# Patient Record
Sex: MALE | Race: WHITE | Employment: FULL TIME | URBAN - METROPOLITAN AREA
[De-identification: names, ages, dates, MRNs, and addresses within clinical notes are randomized per-mention and may not be internally consistent; named-entity substitution may affect disease eponyms.]

---

## 2017-01-09 ENCOUNTER — OFFICE VISIT (OUTPATIENT)
Dept: INTERNAL MEDICINE CLINIC | Facility: CLINIC | Age: 62
End: 2017-01-09

## 2017-01-09 VITALS
WEIGHT: 205 LBS | DIASTOLIC BLOOD PRESSURE: 94 MMHG | SYSTOLIC BLOOD PRESSURE: 196 MMHG | HEART RATE: 87 BPM | BODY MASS INDEX: 29.35 KG/M2 | HEIGHT: 70 IN

## 2017-01-09 DIAGNOSIS — I10 ESSENTIAL HYPERTENSION: Primary | ICD-10-CM

## 2017-01-09 DIAGNOSIS — B18.2 CHRONIC HEPATITIS C WITHOUT HEPATIC COMA (HCC): ICD-10-CM

## 2017-01-09 PROCEDURE — 99212 OFFICE O/P EST SF 10 MIN: CPT | Performed by: INTERNAL MEDICINE

## 2017-01-09 PROCEDURE — 99213 OFFICE O/P EST LOW 20 MIN: CPT | Performed by: INTERNAL MEDICINE

## 2017-01-09 RX ORDER — LISINOPRIL AND HYDROCHLOROTHIAZIDE 20; 12.5 MG/1; MG/1
1 TABLET ORAL
Qty: 30 TABLET | Refills: 5 | Status: SHIPPED | OUTPATIENT
Start: 2017-01-09

## 2017-01-09 NOTE — PATIENT INSTRUCTIONS
Please resume taking lisinopril/HCTZ 20/12.5 mg 1 pill daily. Please obtain blood tests soon when you can. Please try to stop smoking and drinking. Please schedule an appointment with GI.   Return visit in one month for a blood pressure check while juan

## 2017-01-09 NOTE — PROGRESS NOTES
Maida Libman is a 64year old male. Patient presents with:  Hypertension    HPI:   Mr. Laurence Lopez presents this evening for follow-up of hypertension. He is currently not taking lisinopril/HCTZ, having run out sometime ago. No out of office BP monitoring. normal soft nontender without mass or hepatosplenomegaly  EXTREMITIES: Normal without cyanosis or clubbing  PULSES: 2+ bilateral dorsalis pedis and posterior tibial      ASSESSMENT AND PLAN:   1.  Essential hypertension  Poorly controlled, likely secondary

## 2017-01-13 ENCOUNTER — APPOINTMENT (OUTPATIENT)
Dept: LAB | Facility: HOSPITAL | Age: 62
End: 2017-01-13
Attending: INTERNAL MEDICINE
Payer: COMMERCIAL

## 2017-01-13 DIAGNOSIS — B18.2 CHRONIC HEPATITIS C WITHOUT HEPATIC COMA (HCC): ICD-10-CM

## 2017-01-13 LAB
ALBUMIN SERPL BCP-MCNC: 3.9 G/DL (ref 3.5–4.8)
ALBUMIN/GLOB SERPL: 1.2 {RATIO} (ref 1–2)
ALP SERPL-CCNC: 58 U/L (ref 32–100)
ALT SERPL-CCNC: 32 U/L (ref 17–63)
ANION GAP SERPL CALC-SCNC: 8 MMOL/L (ref 0–18)
AST SERPL-CCNC: 35 U/L (ref 15–41)
BILIRUB SERPL-MCNC: 1.5 MG/DL (ref 0.3–1.2)
BUN SERPL-MCNC: 10 MG/DL (ref 8–20)
BUN/CREAT SERPL: 10.2 (ref 10–20)
CALCIUM SERPL-MCNC: 9.3 MG/DL (ref 8.5–10.5)
CHLORIDE SERPL-SCNC: 97 MMOL/L (ref 95–110)
CO2 SERPL-SCNC: 30 MMOL/L (ref 22–32)
CREAT SERPL-MCNC: 0.98 MG/DL (ref 0.5–1.5)
ERYTHROCYTE [DISTWIDTH] IN BLOOD BY AUTOMATED COUNT: 13.1 % (ref 11–15)
GLOBULIN PLAS-MCNC: 3.3 G/DL (ref 2.5–3.7)
GLUCOSE SERPL-MCNC: 111 MG/DL (ref 70–99)
HCT VFR BLD AUTO: 44.1 % (ref 41–52)
HGB BLD-MCNC: 14.9 G/DL (ref 13.5–17.5)
MCH RBC QN AUTO: 31.4 PG (ref 27–32)
MCHC RBC AUTO-ENTMCNC: 33.8 G/DL (ref 32–37)
MCV RBC AUTO: 92.9 FL (ref 80–100)
OSMOLALITY UR CALC.SUM OF ELEC: 280 MOSM/KG (ref 275–295)
PLATELET # BLD AUTO: 235 K/UL (ref 140–400)
PMV BLD AUTO: 9.6 FL (ref 7.4–10.3)
POTASSIUM SERPL-SCNC: 3.7 MMOL/L (ref 3.3–5.1)
PROT SERPL-MCNC: 7.2 G/DL (ref 5.9–8.4)
RBC # BLD AUTO: 4.74 M/UL (ref 4.5–5.9)
SODIUM SERPL-SCNC: 135 MMOL/L (ref 136–144)
WBC # BLD AUTO: 12.2 K/UL (ref 4–11)

## 2017-01-13 PROCEDURE — 80053 COMPREHEN METABOLIC PANEL: CPT

## 2017-01-13 PROCEDURE — 85027 COMPLETE CBC AUTOMATED: CPT

## 2017-01-13 PROCEDURE — 36415 COLL VENOUS BLD VENIPUNCTURE: CPT

## 2017-02-06 ENCOUNTER — OFFICE VISIT (OUTPATIENT)
Dept: INTERNAL MEDICINE CLINIC | Facility: CLINIC | Age: 62
End: 2017-02-06

## 2017-02-06 VITALS
SYSTOLIC BLOOD PRESSURE: 146 MMHG | DIASTOLIC BLOOD PRESSURE: 68 MMHG | HEART RATE: 101 BPM | BODY MASS INDEX: 28.92 KG/M2 | HEIGHT: 70 IN | WEIGHT: 202 LBS

## 2017-02-06 DIAGNOSIS — I10 ESSENTIAL HYPERTENSION: Primary | ICD-10-CM

## 2017-02-06 PROCEDURE — 99213 OFFICE O/P EST LOW 20 MIN: CPT | Performed by: INTERNAL MEDICINE

## 2017-02-06 PROCEDURE — 99212 OFFICE O/P EST SF 10 MIN: CPT | Performed by: INTERNAL MEDICINE

## 2017-02-06 NOTE — PROGRESS NOTES
Walt Castelan is a 64year old male. Patient presents with:  Hypertension    HPI:   Mr. Ange Stanford presents today for follow-up of hypertension. Since his last visit, he has resumed lisinopril/HCTZ. No out of office BP monitoring.   He expresses concern abo hypertension  Improved control though BP not yet at goal.  Discussed adjusting current medications but he declines. Therefore continue lisinopril/HCTZ at current dosage. Return visit in 3 months for blood pressure check.       The patient indicates unders

## 2017-02-15 ENCOUNTER — OFFICE VISIT (OUTPATIENT)
Dept: GASTROENTEROLOGY | Facility: CLINIC | Age: 62
End: 2017-02-15

## 2017-02-15 ENCOUNTER — LAB ENCOUNTER (OUTPATIENT)
Dept: LAB | Facility: HOSPITAL | Age: 62
End: 2017-02-15
Attending: INTERNAL MEDICINE
Payer: COMMERCIAL

## 2017-02-15 VITALS
WEIGHT: 224.63 LBS | HEIGHT: 70 IN | HEART RATE: 83 BPM | DIASTOLIC BLOOD PRESSURE: 88 MMHG | SYSTOLIC BLOOD PRESSURE: 155 MMHG | BODY MASS INDEX: 32.16 KG/M2

## 2017-02-15 DIAGNOSIS — B18.2 CHRONIC HEPATITIS C WITHOUT HEPATIC COMA (HCC): Primary | ICD-10-CM

## 2017-02-15 DIAGNOSIS — B18.2 CHRONIC HEPATITIS C WITH HEPATIC COMA (HCC): Primary | ICD-10-CM

## 2017-02-15 DIAGNOSIS — B18.2 CHRONIC HEPATITIS C WITHOUT HEPATIC COMA (HCC): ICD-10-CM

## 2017-02-15 LAB
ALBUMIN SERPL BCP-MCNC: 4 G/DL (ref 3.5–4.8)
ALBUMIN/GLOB SERPL: 1.3 {RATIO} (ref 1–2)
ALP SERPL-CCNC: 64 U/L (ref 32–100)
ALT SERPL-CCNC: 49 U/L (ref 17–63)
ANION GAP SERPL CALC-SCNC: 8 MMOL/L (ref 0–18)
AST SERPL-CCNC: 39 U/L (ref 15–41)
BASOPHILS # BLD: 0.1 K/UL (ref 0–0.2)
BASOPHILS NFR BLD: 1 %
BILIRUB SERPL-MCNC: 0.4 MG/DL (ref 0.3–1.2)
BUN SERPL-MCNC: 13 MG/DL (ref 8–20)
BUN/CREAT SERPL: 14.4 (ref 10–20)
CALCIUM SERPL-MCNC: 9.4 MG/DL (ref 8.5–10.5)
CHLORIDE SERPL-SCNC: 101 MMOL/L (ref 95–110)
CO2 SERPL-SCNC: 27 MMOL/L (ref 22–32)
CREAT SERPL-MCNC: 0.9 MG/DL (ref 0.5–1.5)
EOSINOPHIL # BLD: 0.4 K/UL (ref 0–0.7)
EOSINOPHIL NFR BLD: 4 %
ERYTHROCYTE [DISTWIDTH] IN BLOOD BY AUTOMATED COUNT: 13.3 % (ref 11–15)
GLOBULIN PLAS-MCNC: 3 G/DL (ref 2.5–3.7)
GLUCOSE SERPL-MCNC: 101 MG/DL (ref 70–99)
HCT VFR BLD AUTO: 42.4 % (ref 41–52)
HGB BLD-MCNC: 14.4 G/DL (ref 13.5–17.5)
LYMPHOCYTES # BLD: 2.5 K/UL (ref 1–4)
LYMPHOCYTES NFR BLD: 23 %
MCH RBC QN AUTO: 30.8 PG (ref 27–32)
MCHC RBC AUTO-ENTMCNC: 34.1 G/DL (ref 32–37)
MCV RBC AUTO: 90.4 FL (ref 80–100)
MONOCYTES # BLD: 1 K/UL (ref 0–1)
MONOCYTES NFR BLD: 9 %
NEUTROPHILS # BLD AUTO: 7.2 K/UL (ref 1.8–7.7)
NEUTROPHILS NFR BLD: 64 %
OSMOLALITY UR CALC.SUM OF ELEC: 282 MOSM/KG (ref 275–295)
PLATELET # BLD AUTO: 196 K/UL (ref 140–400)
PMV BLD AUTO: 10 FL (ref 7.4–10.3)
POTASSIUM SERPL-SCNC: 3.7 MMOL/L (ref 3.3–5.1)
PROT SERPL-MCNC: 7 G/DL (ref 5.9–8.4)
RBC # BLD AUTO: 4.69 M/UL (ref 4.5–5.9)
SODIUM SERPL-SCNC: 136 MMOL/L (ref 136–144)
WBC # BLD AUTO: 11.3 K/UL (ref 4–11)

## 2017-02-15 PROCEDURE — 85025 COMPLETE CBC W/AUTO DIFF WBC: CPT

## 2017-02-15 PROCEDURE — 83883 ASSAY NEPHELOMETRY NOT SPEC: CPT

## 2017-02-15 PROCEDURE — 87902 NFCT AGT GNTYP ALYS HEP C: CPT

## 2017-02-15 PROCEDURE — 36415 COLL VENOUS BLD VENIPUNCTURE: CPT

## 2017-02-15 PROCEDURE — 83520 IMMUNOASSAY QUANT NOS NONAB: CPT

## 2017-02-15 PROCEDURE — 80053 COMPREHEN METABOLIC PANEL: CPT

## 2017-02-15 PROCEDURE — 99213 OFFICE O/P EST LOW 20 MIN: CPT | Performed by: INTERNAL MEDICINE

## 2017-02-15 PROCEDURE — 99212 OFFICE O/P EST SF 10 MIN: CPT | Performed by: INTERNAL MEDICINE

## 2017-02-15 PROCEDURE — 87522 HEPATITIS C REVRS TRNSCRPJ: CPT

## 2017-02-15 RX ORDER — TAMSULOSIN HYDROCHLORIDE 0.4 MG/1
0.4 CAPSULE ORAL DAILY
Refills: 6 | COMMUNITY
Start: 2017-02-12

## 2017-02-17 NOTE — PROGRESS NOTES
HPI:    Patient ID: Juilo C Laureano is a 64year old male. HPI  Linda Spencer returns in follow-up.   He was last seen in May 2016 when he underwent upper and lower endoscopy for an alteration in bowel habits, family history of colon cancer and gastroesophageal ref (40 mg total) by mouth every morning before breakfast. Disp: 30 tablet Rfl: 11   finasteride 5 MG Oral Tab Take 1 tablet (5 mg total) by mouth daily. Disp: 60 tablet Rfl: 6   tamsulosin HCl 0.4 MG Oral Cap Take 0.4 mg by mouth daily.  Disp:  Rfl: 6     Aly RATIO      1.0-2.0 1.2     ANION GAP      0-18 8     BUN/CREA RATIO      10.0-20.0 10.2     CALCULATED OSMOLALITY      275-295 mOsm/kg 280     GFR, Non-      >=60 >60     GFR, -American      >=60 >60     WBC      4.0-11.0 K/UL 12.2 ( optimal.    Gastroesophageal reflux  Controlled on pantoprazole. This should continue.   The patient has endoscopically documented erosive esophagitis    Personal history of adenomatous colon polyps and family history of colon cancer  The patient had multi

## 2017-02-19 LAB
HCV RNA QNT PCR INTERPRETATION: DETECTED
HCV RNA QNT REAL-TIME PCR LOG IU/ML: 6.7 LOG IU

## 2017-02-22 ENCOUNTER — TELEPHONE (OUTPATIENT)
Dept: GASTROENTEROLOGY | Facility: CLINIC | Age: 62
End: 2017-02-22

## 2017-02-22 DIAGNOSIS — B18.2 CHRONIC HEPATITIS C WITHOUT HEPATIC COMA (HCC): Primary | ICD-10-CM

## 2017-02-22 RX ORDER — LEDIPASVIR AND SOFOSBUVIR 90; 400 MG/1; MG/1
TABLET, FILM COATED ORAL DAILY
Qty: 30 TABLET | Refills: 1 | Status: SHIPPED | OUTPATIENT
Start: 2017-02-22 | End: 2017-02-27

## 2017-02-22 NOTE — TELEPHONE ENCOUNTER
----- Message from Lina Carrillo MD sent at 2/21/2017  5:25 PM CST -----  GI RN staff: Please contact the patient tomorrow. Please inform him that he remains hepatitis C positive (he knows that he has hepatitis C). He is genotype 1.   We are waitin

## 2017-02-22 NOTE — TELEPHONE ENCOUNTER
Spoke with Yue Hays @ [employer] and she will be faxing us the form now    [facility] # [phone]    Pt does not have US scheduled until 3/4

## 2017-02-22 NOTE — TELEPHONE ENCOUNTER
Received Fibrospect II results. Placed on MD desk for review    15 - F0-F2 (0-41)    Will investigate approval for Harvoni 90-40mg once daily x 8 weeks, correct?

## 2017-02-22 NOTE — TELEPHONE ENCOUNTER
Will start PA. Med sent to local pharmacy to obtain PA Information    Attempted to call pt today but no answer and no VM set up currently.  Await call back from pt

## 2017-02-27 RX ORDER — LEDIPASVIR AND SOFOSBUVIR 90; 400 MG/1; MG/1
TABLET, FILM COATED ORAL DAILY
Qty: 30 TABLET | Refills: 1 | Status: SHIPPED | OUTPATIENT
Start: 2017-02-27 | End: 2017-02-27

## 2017-02-27 RX ORDER — LEDIPASVIR AND SOFOSBUVIR 90; 400 MG/1; MG/1
TABLET, FILM COATED ORAL DAILY
Qty: 30 TABLET | Refills: 1 | Status: SHIPPED | OUTPATIENT
Start: 2017-02-27 | End: 2017-03-29

## 2017-02-27 NOTE — TELEPHONE ENCOUNTER
Spoke with pt and reviewed results and Naomy Jayden approval    Was able to obtain [facility] assistance card which I am hoping he can use with [facility] pharmacy    [address] [facility] [id number]  [address] [facility] [phone]

## 2017-02-27 NOTE — TELEPHONE ENCOUNTER
Received approval fax back from [address] [place] [address],[address] for [place] 90-400mg once daily.  Valid through 2/22/17-5/17/2017 with 2 valid prescription refills only for this duration for this approval.  Case # [id number]  Spoke with Karen Casillas from [employer] - 2/27 to confirm b

## 2017-02-27 NOTE — TELEPHONE ENCOUNTER
Attempted to reach pt again (see previous attempt) regarding approval and to discuss mailorder and copay.     FYI sent to [facility] that approval for 8 weeks was obtained

## 2017-03-02 NOTE — TELEPHONE ENCOUNTER
Contacted [facility] today to confirm copay. Still $150 but they have not processed with copay assistance. I gave all copay card information. Processed with $5 copay now.     Pt needs to call [facility] to confirm shipment of medication - [phone]    No answer

## 2017-03-02 NOTE — TELEPHONE ENCOUNTER
Spoke with pt and reviewed okay to hold on US liver for now. Not to say he may want to order in future to check on liver    He will call [facility] today to schedule shipment. He will call us before starting on Harvoni so we can document.   I have already remin

## 2017-03-02 NOTE — TELEPHONE ENCOUNTER
Pt called to find out when he is supposed to expect to receive medication and also needs to know if he should still have U/S because he already has Hep C diagnosis. Please call.

## 2017-03-06 ENCOUNTER — TELEPHONE (OUTPATIENT)
Dept: GASTROENTEROLOGY | Facility: CLINIC | Age: 62
End: 2017-03-06

## 2017-03-06 NOTE — TELEPHONE ENCOUNTER
Received medicine - [facility] but now states he is holding off until after Eye surgery. Reviewed there is no guarantee now that we can extended the approval dates OR no guarantee if his insurance changes that we can get approval he needs.   Reviewed best not

## 2017-03-10 ENCOUNTER — TELEPHONE (OUTPATIENT)
Dept: GASTROENTEROLOGY | Facility: CLINIC | Age: 62
End: 2017-03-10

## 2017-03-10 NOTE — TELEPHONE ENCOUNTER
Kaycee, Rn - I phoned pt re: below (also see your previous note). He states the eye doc gave approval to start med. He started 2 days ago. He would like you to order the next round b/c his insurance is changing. I was not sure if this was too soon.    Se

## 2017-03-10 NOTE — TELEPHONE ENCOUNTER
Pt called and stated that he started taking the med on 03/08/2017. Please call thank you        Current outpatient prescriptions:   •  Ledipasvir-Sofosbuvir (HARVONI)  MG Oral Tab, Take  mg by mouth daily. , Disp: 30 tablet, Rfl: 1

## 2017-03-21 ENCOUNTER — TELEPHONE (OUTPATIENT)
Dept: GASTROENTEROLOGY | Facility: CLINIC | Age: 62
End: 2017-03-21

## 2017-03-21 NOTE — TELEPHONE ENCOUNTER
Tried to reach pt. Voicemail not set up yet. Unable to leave message. Only one phone # listed. Phoned Coral at [facility] and received VM.  Left detailed message that 1) He needs to call PCP for clearance (qasim Nix name and #) and 2) I

## 2017-03-21 NOTE — TELEPHONE ENCOUNTER
Pt is having cataract surgery on 3/23/17 and states that pt is being treated for Hep C. Please fax latest lab results and also note that states OK to have cataract surgery under MAC sedation. Please fax to [phone].

## 2017-03-21 NOTE — TELEPHONE ENCOUNTER
Dr. Trish Dillon - Please see below request from [facility]. 1) Pls write letter re: surgical clearance and using MAC if agreed  2) I have labs with cover sheet at my desk if ok to send as well.

## 2017-03-22 ENCOUNTER — TELEPHONE (OUTPATIENT)
Dept: INTERNAL MEDICINE CLINIC | Facility: CLINIC | Age: 62
End: 2017-03-22

## 2017-03-22 NOTE — TELEPHONE ENCOUNTER
Pt states he left message for Dr Luz Rueda office a week ago and they called him yesterday to inform of below. Now they are cancelling eye surgery due to no clearance.     I advised pt that I had previously discussed no surgery while on Hep C treatment for t

## 2017-03-22 NOTE — TELEPHONE ENCOUNTER
Dolores Starks from Dr. Hanson Patricia office calling regarding needing pt recent lab results for his liver enzymes. .. please advise and fax to [phone] pt is coming in tomorrow

## 2017-03-27 ENCOUNTER — LAB ENCOUNTER (OUTPATIENT)
Dept: LAB | Facility: HOSPITAL | Age: 62
End: 2017-03-27
Attending: INTERNAL MEDICINE
Payer: COMMERCIAL

## 2017-03-27 DIAGNOSIS — B18.2 CHRONIC HEPATITIS C WITHOUT HEPATIC COMA (HCC): ICD-10-CM

## 2017-03-27 LAB
ALBUMIN SERPL BCP-MCNC: 4.1 G/DL (ref 3.5–4.8)
ALBUMIN/GLOB SERPL: 1.1 {RATIO} (ref 1–2)
ALP SERPL-CCNC: 64 U/L (ref 32–100)
ALT SERPL-CCNC: 21 U/L (ref 17–63)
ANION GAP SERPL CALC-SCNC: 9 MMOL/L (ref 0–18)
AST SERPL-CCNC: 26 U/L (ref 15–41)
BASOPHILS # BLD: 0.1 K/UL (ref 0–0.2)
BASOPHILS NFR BLD: 1 %
BILIRUB SERPL-MCNC: 0.7 MG/DL (ref 0.3–1.2)
BUN SERPL-MCNC: 21 MG/DL (ref 8–20)
BUN/CREAT SERPL: 19.3 (ref 10–20)
CALCIUM SERPL-MCNC: 10.2 MG/DL (ref 8.5–10.5)
CHLORIDE SERPL-SCNC: 101 MMOL/L (ref 95–110)
CO2 SERPL-SCNC: 29 MMOL/L (ref 22–32)
CREAT SERPL-MCNC: 1.09 MG/DL (ref 0.5–1.5)
EOSINOPHIL # BLD: 0.5 K/UL (ref 0–0.7)
EOSINOPHIL NFR BLD: 5 %
ERYTHROCYTE [DISTWIDTH] IN BLOOD BY AUTOMATED COUNT: 13.8 % (ref 11–15)
GLOBULIN PLAS-MCNC: 3.8 G/DL (ref 2.5–3.7)
GLUCOSE SERPL-MCNC: 108 MG/DL (ref 70–99)
HCT VFR BLD AUTO: 46.6 % (ref 41–52)
HGB BLD-MCNC: 15.8 G/DL (ref 13.5–17.5)
LYMPHOCYTES # BLD: 2.8 K/UL (ref 1–4)
LYMPHOCYTES NFR BLD: 29 %
MCH RBC QN AUTO: 30.9 PG (ref 27–32)
MCHC RBC AUTO-ENTMCNC: 33.9 G/DL (ref 32–37)
MCV RBC AUTO: 91.2 FL (ref 80–100)
MONOCYTES # BLD: 1 K/UL (ref 0–1)
MONOCYTES NFR BLD: 11 %
NEUTROPHILS # BLD AUTO: 5.4 K/UL (ref 1.8–7.7)
NEUTROPHILS NFR BLD: 55 %
OSMOLALITY UR CALC.SUM OF ELEC: 292 MOSM/KG (ref 275–295)
PLATELET # BLD AUTO: 222 K/UL (ref 140–400)
PMV BLD AUTO: 9.3 FL (ref 7.4–10.3)
POTASSIUM SERPL-SCNC: 4.9 MMOL/L (ref 3.3–5.1)
PROT SERPL-MCNC: 7.9 G/DL (ref 5.9–8.4)
RBC # BLD AUTO: 5.11 M/UL (ref 4.5–5.9)
SODIUM SERPL-SCNC: 139 MMOL/L (ref 136–144)
WBC # BLD AUTO: 9.8 K/UL (ref 4–11)

## 2017-03-27 PROCEDURE — 85025 COMPLETE CBC W/AUTO DIFF WBC: CPT

## 2017-03-27 PROCEDURE — 87522 HEPATITIS C REVRS TRNSCRPJ: CPT

## 2017-03-27 PROCEDURE — 36415 COLL VENOUS BLD VENIPUNCTURE: CPT

## 2017-03-27 PROCEDURE — 80053 COMPREHEN METABOLIC PANEL: CPT

## 2017-03-29 DIAGNOSIS — B18.2 CHRONIC HEPATITIS C WITHOUT HEPATIC COMA (HCC): Primary | ICD-10-CM

## 2017-03-29 LAB
HCV RNA QNT PCR INTERPRETATION: NOT DETECTED
HCV RNA QNT REAL-TIME PCR IU/ML: <15 IU/ML
HCV RNA QNT REAL-TIME PCR LOG IU/ML: <1.2 LOG IU

## 2017-03-29 NOTE — TELEPHONE ENCOUNTER
----- Message from Shilpa De Anda MD sent at 3/29/2017  9:51 AM CDT -----  Please inform patient that his blood work is normal and the hepatitis C virus is undetected. He should continue the [facility]. Repeat CBC, CMP and hepatitis C RNA in 1 month.

## 2017-03-31 NOTE — TELEPHONE ENCOUNTER
Pt contacted and reviewed labwork below. He states he has new insurance - [employer] with high deductible      He did not have insurance info now to give to me.  Will call  Him next week - he wants to know what the cost is for labs

## 2017-08-11 ENCOUNTER — TELEPHONE (OUTPATIENT)
Dept: GASTROENTEROLOGY | Facility: CLINIC | Age: 62
End: 2017-08-11

## 2017-08-11 NOTE — TELEPHONE ENCOUNTER
Mailed letter to patient notifying him of overdue labs (CBC, CMP, and HCV RT-PCR ) Ordered 3-29-17 and due 1 month after order was placed. Overdue letter mailed to patient.

## 2020-01-01 ENCOUNTER — EXTERNAL RECORD (OUTPATIENT)
Dept: OTHER | Age: 65
End: 2020-01-01

## 2020-01-29 ENCOUNTER — HOSPITAL (OUTPATIENT)
Dept: OTHER | Age: 65
End: 2020-01-29
Attending: ANESTHESIOLOGY

## 2020-01-30 ENCOUNTER — HOSPITAL (OUTPATIENT)
Dept: OTHER | Age: 65
End: 2020-01-30
Attending: ANESTHESIOLOGY

## 2020-02-19 ENCOUNTER — HOSPITAL (OUTPATIENT)
Dept: OTHER | Age: 65
End: 2020-02-19
Attending: ORTHOPAEDIC SURGERY

## 2020-02-20 ENCOUNTER — HOSPITAL (OUTPATIENT)
Dept: OTHER | Age: 65
End: 2020-02-20
Attending: ANESTHESIOLOGY

## 2020-02-21 ENCOUNTER — HOSPITAL (OUTPATIENT)
Dept: OTHER | Age: 65
End: 2020-02-21
Attending: ANESTHESIOLOGY

## 2020-02-25 ENCOUNTER — HOSPITAL (OUTPATIENT)
Dept: OTHER | Age: 65
End: 2020-02-25
Attending: FAMILY MEDICINE

## 2020-02-25 LAB
A/G RATIO_: 1.4
ABS LYMPH: 2.3 K/CUMM (ref 1–3.5)
ABS MONO: 1.1 K/CUMM (ref 0.1–0.8)
ABS NEUTRO: 7.7 K/CUMM (ref 2–8)
ALBUMIN: 4.3 G/DL (ref 3.5–5)
ALK PHOS: 83 UNIT/L (ref 50–124)
ALT/GPT: 12 UNIT/L (ref 0–55)
ANION GAP SERPL CALC-SCNC: 15 MEQ/L (ref 10–20)
AST/GOT: 15 UNIT/L (ref 5–34)
BASOPHIL: 1 % (ref 0–1)
BILI TOTAL: 0.2 MG/DL (ref 0.2–1)
BUN SERPL-MCNC: 17 MG/DL (ref 6–20)
CALCIUM: 9.6 MG/DL (ref 8.4–10.2)
CHLORIDE: 102 MEQ/L (ref 97–107)
CREATININE: 1.09 MG/DL (ref 0.6–1.3)
DIFF_TYPE?: ABNORMAL
EOSINOPHIL: 1 % (ref 0–6)
GLOBULIN_: 3.1 G/DL (ref 2–4.1)
GLUCOSE LVL: 92 MG/DL (ref 70–99)
HCT VFR BLD CALC: 46 % (ref 36–51)
HEMOLYSIS 2+: NEGATIVE
HGB A1C: 5.5 %
HGB BLD-MCNC: 15.5 G/DL (ref 12–17)
IMMATURE GRAN: 0.9 % (ref 0–0.3)
INSTR WBC: 11.5 K/CUMM (ref 4–11)
LIPEMIC 3+: NEGATIVE
LYMPHOCYTE: 20 %
MCH RBC QN AUTO: 31 PG (ref 25–35)
MCHC RBC AUTO-ENTMCNC: 33 G/DL (ref 32–37)
MCV RBC AUTO: 94 FL (ref 78–97)
MONOCYTE: 10 %
NEUTROPHIL: 67 %
NRBC BLD MANUAL-RTO: 0 % (ref 0–0.2)
PLATELET: 231 K/CUMM (ref 150–450)
POTASSIUM: 4.8 MEQ/L (ref 3.5–5.1)
RBC # BLD: 4.94 M/CUMM (ref 4.2–6)
RDW: 12.8 % (ref 11.5–14.5)
SODIUM: 142 MEQ/L (ref 136–145)
TCO2: 30 MEQ/L (ref 19–29)
TOTAL PROTEIN: 7.4 G/DL (ref 6.4–8.3)
WBC # BLD: 11.5 K/CUMM (ref 4–11)

## 2020-02-27 ENCOUNTER — HOSPITAL (OUTPATIENT)
Dept: OTHER | Age: 65
End: 2020-02-27
Attending: ANESTHESIOLOGY

## 2020-03-02 ENCOUNTER — HOSPITAL (OUTPATIENT)
Dept: OTHER | Age: 65
End: 2020-03-02
Attending: ORTHOPAEDIC SURGERY

## 2020-08-11 ENCOUNTER — HOSPITAL (OUTPATIENT)
Dept: OTHER | Age: 65
End: 2020-08-11
Attending: PHYSICIAN ASSISTANT

## 2020-09-01 LAB
A/G RATIO_: 1.4
A/G RATIO_: 1.4
ABS LYMPH: 1.9 K/CUMM (ref 1–3.5)
ABS LYMPH: 1.9 K/CUMM (ref 1–3.5)
ABS MONO: 0.8 K/CUMM (ref 0.1–0.8)
ABS MONO: 0.8 K/CUMM (ref 0.1–0.8)
ABS NEUTRO: 4.7 K/CUMM (ref 2–8)
ABS NEUTRO: 4.7 K/CUMM (ref 2–8)
ALBUMIN: 4 G/DL (ref 3.5–5)
ALBUMIN: 4 G/DL (ref 3.5–5)
ALK PHOS: 98 UNIT/L (ref 50–124)
ALK PHOS: 98 UNIT/L (ref 50–124)
ALT/GPT: 8 UNIT/L (ref 0–55)
ALT/GPT: 8 UNIT/L (ref 0–55)
ANION GAP SERPL CALC-SCNC: 11 MEQ/L (ref 10–20)
ANION GAP SERPL CALC-SCNC: 11 MEQ/L (ref 10–20)
AST/GOT: 13 UNIT/L (ref 5–34)
AST/GOT: 13 UNIT/L (ref 5–34)
BASOPHIL: 1 % (ref 0–1)
BASOPHIL: 1 % (ref 0–1)
BILI TOTAL: 0.4 MG/DL (ref 0.2–1)
BILI TOTAL: 0.4 MG/DL (ref 0.2–1)
BUN SERPL-MCNC: 8 MG/DL (ref 6–20)
BUN SERPL-MCNC: 8 MG/DL (ref 6–20)
CALCIUM: 9.3 MG/DL (ref 8.4–10.2)
CALCIUM: 9.3 MG/DL (ref 8.4–10.2)
CHLORIDE: 107 MEQ/L (ref 97–107)
CHLORIDE: 107 MEQ/L (ref 97–107)
CREATININE: 0.9 MG/DL (ref 0.6–1.3)
CREATININE: 0.9 MG/DL (ref 0.6–1.3)
DIFF_TYPE?: NORMAL
EOSINOPHIL: 5 % (ref 0–6)
EOSINOPHIL: 5 % (ref 0–6)
GLOBULIN_: 2.9 G/DL (ref 2–4.1)
GLOBULIN_: 2.9 G/DL (ref 2–4.1)
GLUCOSE LVL: 79 MG/DL (ref 70–99)
GLUCOSE LVL: 79 MG/DL (ref 70–99)
HCT VFR BLD CALC: 43 % (ref 36–51)
HCT VFR BLD CALC: 43 % (ref 36–51)
HEMOLYSIS 2+: NEGATIVE
HGB BLD-MCNC: 14.5 G/DL (ref 12–17)
HGB BLD-MCNC: 14.5 G/DL (ref 12–17)
IMMATURE GRAN: 0.3 % (ref 0–0.3)
IMMATURE GRAN: 0.3 % (ref 0–0.3)
INSTR WBC: 7.9 K/CUMM (ref 4–11)
LIPEMIC 3+: NEGATIVE
LYMPHOCYTE: 24 %
LYMPHOCYTE: 24 %
MCH RBC QN AUTO: 30 PG (ref 25–35)
MCH RBC QN AUTO: 30 PG (ref 25–35)
MCHC RBC AUTO-ENTMCNC: 33 G/DL (ref 32–37)
MCHC RBC AUTO-ENTMCNC: 33 G/DL (ref 32–37)
MCV RBC AUTO: 91 FL (ref 78–97)
MCV RBC AUTO: 91 FL (ref 78–97)
MONOCYTE: 10 %
MONOCYTE: 10 %
NEUTROPHIL: 60 %
NEUTROPHIL: 60 %
NRBC BLD MANUAL-RTO: 0 % (ref 0–0.2)
NRBC BLD MANUAL-RTO: 0 % (ref 0–0.2)
PLATELET: 213 K/CUMM (ref 150–450)
PLATELET: 213 K/CUMM (ref 150–450)
POTASSIUM: 4.2 MEQ/L (ref 3.5–5.1)
POTASSIUM: 4.2 MEQ/L (ref 3.5–5.1)
RBC # BLD: 4.76 M/CUMM (ref 4.2–6)
RBC # BLD: 4.76 M/CUMM (ref 4.2–6)
RDW: 12.9 % (ref 11.5–14.5)
RDW: 12.9 % (ref 11.5–14.5)
SODIUM: 142 MEQ/L (ref 136–145)
SODIUM: 142 MEQ/L (ref 136–145)
TCO2: 28 MEQ/L (ref 19–29)
TCO2: 28 MEQ/L (ref 19–29)
TOTAL PROTEIN: 6.9 G/DL (ref 6.4–8.3)
TOTAL PROTEIN: 6.9 G/DL (ref 6.4–8.3)
WBC # BLD: 7.9 K/CUMM (ref 4–11)
WBC # BLD: 7.9 K/CUMM (ref 4–11)

## 2020-09-11 LAB
A/G RATIO_: 1.2
A/G RATIO_: 1.2
ABS LYMPH: 1.8 K/CUMM (ref 1–3.5)
ABS LYMPH: 1.8 K/CUMM (ref 1–3.5)
ABS MONO: 0.8 K/CUMM (ref 0.1–0.8)
ABS MONO: 0.8 K/CUMM (ref 0.1–0.8)
ABS NEUTRO: 5.8 K/CUMM (ref 2–8)
ABS NEUTRO: 5.8 K/CUMM (ref 2–8)
ALBUMIN: 4.1 G/DL (ref 3.5–5)
ALBUMIN: 4.1 G/DL (ref 3.5–5)
ALK PHOS: 115 UNIT/L (ref 50–124)
ALK PHOS: 115 UNIT/L (ref 50–124)
ALT/GPT: 9 UNIT/L (ref 0–55)
ALT/GPT: 9 UNIT/L (ref 0–55)
ANION GAP SERPL CALC-SCNC: 13 MEQ/L (ref 10–20)
ANION GAP SERPL CALC-SCNC: 13 MEQ/L (ref 10–20)
AST/GOT: 13 UNIT/L (ref 5–34)
AST/GOT: 13 UNIT/L (ref 5–34)
BASOPHIL: 1 % (ref 0–1)
BASOPHIL: 1 % (ref 0–1)
BILI TOTAL: 0.2 MG/DL (ref 0.2–1)
BILI TOTAL: 0.2 MG/DL (ref 0.2–1)
BUN SERPL-MCNC: 17 MG/DL (ref 6–20)
BUN SERPL-MCNC: 17 MG/DL (ref 6–20)
CALCIUM: 9.4 MG/DL (ref 8.4–10.2)
CALCIUM: 9.4 MG/DL (ref 8.4–10.2)
CHLORIDE: 106 MEQ/L (ref 97–107)
CHLORIDE: 106 MEQ/L (ref 97–107)
CREATININE: 0.98 MG/DL (ref 0.6–1.3)
CREATININE: 0.98 MG/DL (ref 0.6–1.3)
DIFF_TYPE?: NORMAL
EOSINOPHIL: 5 % (ref 0–6)
EOSINOPHIL: 5 % (ref 0–6)
GLOBULIN_: 3.3 G/DL (ref 2–4.1)
GLOBULIN_: 3.3 G/DL (ref 2–4.1)
GLUCOSE LVL: 98 MG/DL (ref 70–99)
GLUCOSE LVL: 98 MG/DL (ref 70–99)
HCT VFR BLD CALC: 45 % (ref 36–51)
HCT VFR BLD CALC: 45 % (ref 36–51)
HEMOLYSIS 2+: NEGATIVE
HGB BLD-MCNC: 15.2 G/DL (ref 12–17)
HGB BLD-MCNC: 15.2 G/DL (ref 12–17)
IMMATURE GRAN: 0.2 % (ref 0–0.3)
IMMATURE GRAN: 0.2 % (ref 0–0.3)
INSTR WBC: 8.9 K/CUMM (ref 4–11)
LIPEMIC 3+: NEGATIVE
LYMPHOCYTE: 20 %
LYMPHOCYTE: 20 %
MCH RBC QN AUTO: 31 PG (ref 25–35)
MCH RBC QN AUTO: 31 PG (ref 25–35)
MCHC RBC AUTO-ENTMCNC: 34 G/DL (ref 32–37)
MCHC RBC AUTO-ENTMCNC: 34 G/DL (ref 32–37)
MCV RBC AUTO: 92 FL (ref 78–97)
MCV RBC AUTO: 92 FL (ref 78–97)
MONOCYTE: 9 %
MONOCYTE: 9 %
NEUTROPHIL: 65 %
NEUTROPHIL: 65 %
NRBC BLD MANUAL-RTO: 0 % (ref 0–0.2)
NRBC BLD MANUAL-RTO: 0 % (ref 0–0.2)
PLATELET: 246 K/CUMM (ref 150–450)
PLATELET: 246 K/CUMM (ref 150–450)
POTASSIUM: 5 MEQ/L (ref 3.5–5.1)
POTASSIUM: 5 MEQ/L (ref 3.5–5.1)
RBC # BLD: 4.91 M/CUMM (ref 4.2–6)
RBC # BLD: 4.91 M/CUMM (ref 4.2–6)
RDW: 12.9 % (ref 11.5–14.5)
RDW: 12.9 % (ref 11.5–14.5)
SODIUM: 141 MEQ/L (ref 136–145)
SODIUM: 141 MEQ/L (ref 136–145)
TCO2: 27 MEQ/L (ref 19–29)
TCO2: 27 MEQ/L (ref 19–29)
TOTAL PROTEIN: 7.4 G/DL (ref 6.4–8.3)
TOTAL PROTEIN: 7.4 G/DL (ref 6.4–8.3)
WBC # BLD: 8.9 K/CUMM (ref 4–11)
WBC # BLD: 8.9 K/CUMM (ref 4–11)

## 2020-09-12 LAB
ISOLATION GUIDELINES: NORMAL
SARS-COV-2 RNA RESP QL NAA+PROBE: NOT DETECTED
SOURCE, 2019 NOVEL CORONAVIRUS (SARS-COV-2): NORMAL

## 2020-09-13 LAB
SARS-COV-2 RNA SPEC QL NAA+PROBE: NOT DETECTED
SPECIMEN SOURCE: NORMAL

## 2020-09-14 ENCOUNTER — HOSPITAL (OUTPATIENT)
Dept: OTHER | Age: 65
End: 2020-09-14
Attending: ORTHOPAEDIC SURGERY

## 2020-09-14 ENCOUNTER — HOSPITAL (OUTPATIENT)
Dept: OTHER | Age: 65
End: 2020-09-14

## 2020-09-14 LAB
ANION GAP SERPL CALC-SCNC: 13 MEQ/L (ref 10–20)
ANION GAP SERPL CALC-SCNC: 13 MEQ/L (ref 10–20)
BUN SERPL-MCNC: 14 MG/DL (ref 6–20)
BUN SERPL-MCNC: 14 MG/DL (ref 6–20)
CALCIUM: 9.1 MG/DL (ref 8.4–10.2)
CALCIUM: 9.1 MG/DL (ref 8.4–10.2)
CHLORIDE: 105 MEQ/L (ref 97–107)
CHLORIDE: 105 MEQ/L (ref 97–107)
CREATININE: 0.88 MG/DL (ref 0.6–1.3)
CREATININE: 0.88 MG/DL (ref 0.6–1.3)
GLUCOSE LVL: 78 MG/DL (ref 70–99)
GLUCOSE LVL: 78 MG/DL (ref 70–99)
HEMOLYSIS 2+: NEGATIVE
POTASSIUM: 3.8 MEQ/L (ref 3.5–5.1)
POTASSIUM: 3.8 MEQ/L (ref 3.5–5.1)
REPORT TEXT: NORMAL
SODIUM: 139 MEQ/L (ref 136–145)
SODIUM: 139 MEQ/L (ref 136–145)
TCO2: 25 MEQ/L (ref 19–29)
TCO2: 25 MEQ/L (ref 19–29)

## 2020-10-23 ENCOUNTER — HOSPITAL (OUTPATIENT)
Dept: OTHER | Age: 65
End: 2020-10-23
Attending: STUDENT IN AN ORGANIZED HEALTH CARE EDUCATION/TRAINING PROGRAM

## 2020-11-18 ENCOUNTER — IMAGING SERVICES (OUTPATIENT)
Dept: OTHER | Age: 65
End: 2020-11-18

## 2020-12-14 ENCOUNTER — HOSPITAL ENCOUNTER (OUTPATIENT)
Dept: GENERAL RADIOLOGY | Age: 65
Discharge: HOME | End: 2020-12-14
Attending: FAMILY MEDICINE

## 2020-12-14 ENCOUNTER — HOSPITAL ENCOUNTER (OUTPATIENT)
Dept: LAB | Age: 65
Discharge: HOME | End: 2020-12-14
Attending: FAMILY MEDICINE

## 2020-12-14 DIAGNOSIS — M25.50 JOINT PAIN: Primary | ICD-10-CM

## 2020-12-14 DIAGNOSIS — F17.200 NICOTINE DEPENDENCE: ICD-10-CM

## 2020-12-14 DIAGNOSIS — Z72.0 TOBACCO USE: ICD-10-CM

## 2020-12-14 DIAGNOSIS — Z01.818 PREOP EXAMINATION: ICD-10-CM

## 2020-12-14 DIAGNOSIS — M25.50 JOINT PAIN: ICD-10-CM

## 2020-12-14 LAB
ALBUMIN SERPL-MCNC: 3.8 G/DL (ref 3.6–5.1)
ALBUMIN/GLOB SERPL: 1.1 {RATIO} (ref 1–2.4)
ALP SERPL-CCNC: 108 UNITS/L (ref 45–117)
ALT SERPL-CCNC: 17 UNITS/L
ANION GAP SERPL CALC-SCNC: 13 MMOL/L (ref 10–20)
AST SERPL-CCNC: 17 UNITS/L
BASOPHILS # BLD: 0.1 K/MCL (ref 0–0.3)
BASOPHILS NFR BLD: 1 %
BILIRUB SERPL-MCNC: 0.2 MG/DL (ref 0.2–1)
BUN SERPL-MCNC: 17 MG/DL (ref 6–20)
BUN/CREAT SERPL: 18 (ref 7–25)
CALCIUM SERPL-MCNC: 9.2 MG/DL (ref 8.4–10.2)
CHLORIDE SERPL-SCNC: 102 MMOL/L (ref 98–107)
CO2 SERPL-SCNC: 28 MMOL/L (ref 21–32)
CREAT SERPL-MCNC: 0.97 MG/DL (ref 0.67–1.17)
DEPRECATED RDW RBC: 43.2 FL (ref 39–50)
EOSINOPHIL # BLD: 0.4 K/MCL (ref 0–0.5)
EOSINOPHIL NFR BLD: 4 %
ERYTHROCYTE [DISTWIDTH] IN BLOOD: 12.7 % (ref 11–15)
FASTING DURATION TIME PATIENT: ABNORMAL H
GFR SERPLBLD BASED ON 1.73 SQ M-ARVRAT: 82 ML/MIN/1.73M2
GLOBULIN SER-MCNC: 3.6 G/DL (ref 2–4)
GLUCOSE SERPL-MCNC: 85 MG/DL (ref 65–99)
HCT VFR BLD CALC: 46.8 % (ref 39–51)
HGB BLD-MCNC: 15.5 G/DL (ref 13–17)
IMM GRANULOCYTES # BLD AUTO: 0 K/MCL (ref 0–0.2)
IMM GRANULOCYTES # BLD: 0 %
LYMPHOCYTES # BLD: 2.1 K/MCL (ref 1–4)
LYMPHOCYTES NFR BLD: 27 %
MCH RBC QN AUTO: 30.5 PG (ref 26–34)
MCHC RBC AUTO-ENTMCNC: 33.1 G/DL (ref 32–36.5)
MCV RBC AUTO: 91.9 FL (ref 78–100)
MONOCYTES # BLD: 0.8 K/MCL (ref 0.3–0.9)
MONOCYTES NFR BLD: 10 %
NEUTROPHILS # BLD: 4.6 K/MCL (ref 1.8–7.7)
NEUTROPHILS NFR BLD: 58 %
NRBC BLD MANUAL-RTO: 0 /100 WBC
PLATELET # BLD AUTO: 221 K/MCL (ref 140–450)
POTASSIUM SERPL-SCNC: 4.2 MMOL/L (ref 3.4–5.1)
PROT SERPL-MCNC: 7.4 G/DL (ref 6.4–8.2)
RBC # BLD: 5.09 MIL/MCL (ref 4.5–5.9)
SODIUM SERPL-SCNC: 139 MMOL/L (ref 135–145)
WBC # BLD: 8 K/MCL (ref 4.2–11)

## 2020-12-14 PROCEDURE — 80053 COMPREHEN METABOLIC PANEL: CPT | Performed by: FAMILY MEDICINE

## 2020-12-14 PROCEDURE — 71046 X-RAY EXAM CHEST 2 VIEWS: CPT

## 2020-12-14 PROCEDURE — 85025 COMPLETE CBC W/AUTO DIFF WBC: CPT | Performed by: FAMILY MEDICINE

## 2020-12-14 PROCEDURE — 36415 COLL VENOUS BLD VENIPUNCTURE: CPT | Performed by: FAMILY MEDICINE

## 2020-12-28 RX ORDER — DULOXETIN HYDROCHLORIDE 30 MG/1
30 CAPSULE, DELAYED RELEASE ORAL EVERY MORNING
COMMUNITY
End: 2022-01-19 | Stop reason: ALTCHOICE

## 2020-12-28 RX ORDER — CARBAMAZEPINE 100 MG/1
100 TABLET, EXTENDED RELEASE ORAL EVERY MORNING
COMMUNITY
End: 2022-01-19 | Stop reason: ALTCHOICE

## 2020-12-28 RX ORDER — GABAPENTIN 300 MG/1
300 CAPSULE ORAL 2 TIMES DAILY
COMMUNITY

## 2020-12-28 SDOH — HEALTH STABILITY: MENTAL HEALTH: HOW OFTEN DO YOU HAVE A DRINK CONTAINING ALCOHOL?: NEVER

## 2020-12-28 ASSESSMENT — ACTIVITIES OF DAILY LIVING (ADL)
NEEDS_ASSIST: NO
ADL_BEFORE_ADMISSION: INDEPENDENT
ADL_SHORT_OF_BREATH: YES
HISTORY OF FALLING IN THE LAST YEAR (PRIOR TO ADMISSION): YES
SENSORY_SUPPORT_DEVICES: EYEGLASSES
ADL_SCORE: 12

## 2020-12-28 ASSESSMENT — COGNITIVE AND FUNCTIONAL STATUS - GENERAL
ARE YOU DEAF OR DO YOU HAVE SERIOUS DIFFICULTY  HEARING: NO
ARE YOU BLIND OR DO YOU HAVE SERIOUS DIFFICULTY SEEING, EVEN WHEN WEARING GLASSES: NO

## 2021-01-02 ENCOUNTER — LAB SERVICES (OUTPATIENT)
Dept: LAB | Age: 66
DRG: 494 | End: 2021-01-02
Attending: INTERNAL MEDICINE

## 2021-01-02 DIAGNOSIS — Z01.812 PRE-PROCEDURAL LABORATORY EXAMINATION: ICD-10-CM

## 2021-01-02 PROCEDURE — U0005 INFEC AGEN DETEC AMPLI PROBE: HCPCS

## 2021-01-03 LAB
SARS-COV-2 RNA RESP QL NAA+PROBE: NOT DETECTED
SERVICE CMNT-IMP: NORMAL
SERVICE CMNT-IMP: NORMAL

## 2021-01-05 ENCOUNTER — ANESTHESIA (OUTPATIENT)
Dept: SURGERY | Age: 66
DRG: 494 | End: 2021-01-05

## 2021-01-05 ENCOUNTER — ANESTHESIA EVENT (OUTPATIENT)
Dept: SURGERY | Age: 66
DRG: 494 | End: 2021-01-05

## 2021-01-05 ENCOUNTER — HOSPITAL ENCOUNTER (INPATIENT)
Age: 66
LOS: 2 days | Discharge: NURSING, REHAB OR LONG-TERM CARE | DRG: 494 | End: 2021-01-07
Attending: ORTHOPAEDIC SURGERY | Admitting: ORTHOPAEDIC SURGERY

## 2021-01-05 DIAGNOSIS — R33.8 ACUTE URINARY RETENTION: ICD-10-CM

## 2021-01-05 DIAGNOSIS — I15.9 SECONDARY HYPERTENSION: ICD-10-CM

## 2021-01-05 DIAGNOSIS — M19.079 ANKLE ARTHRITIS: ICD-10-CM

## 2021-01-05 DIAGNOSIS — Z72.0 TOBACCO USE: ICD-10-CM

## 2021-01-05 DIAGNOSIS — I10 ESSENTIAL HYPERTENSION, MALIGNANT: ICD-10-CM

## 2021-01-05 DIAGNOSIS — Z01.812 PRE-PROCEDURAL LABORATORY EXAMINATION: Primary | ICD-10-CM

## 2021-01-05 DIAGNOSIS — J44.9 CHRONIC OBSTRUCTIVE PULMONARY DISEASE, UNSPECIFIED COPD TYPE (CMD): ICD-10-CM

## 2021-01-05 PROCEDURE — 97530 THERAPEUTIC ACTIVITIES: CPT

## 2021-01-05 PROCEDURE — 10002800 HB RX 250 W HCPCS: Performed by: PHYSICIAN ASSISTANT

## 2021-01-05 PROCEDURE — 13000006 HB ANESTHESIA MAC S/U + 1ST 15 MIN: Performed by: ORTHOPAEDIC SURGERY

## 2021-01-05 PROCEDURE — 13000007 HB ANESTHESIA MAC EA ADD MINUTE: Performed by: ORTHOPAEDIC SURGERY

## 2021-01-05 PROCEDURE — 13000118 HB ORTHO MAJOR COMPLEX CASE S/U + 1ST 15 MIN: Performed by: ORTHOPAEDIC SURGERY

## 2021-01-05 PROCEDURE — C1713 ANCHOR/SCREW BN/BN,TIS/BN: HCPCS | Performed by: ORTHOPAEDIC SURGERY

## 2021-01-05 PROCEDURE — 10002800 HB RX 250 W HCPCS: Performed by: NURSE ANESTHETIST, CERTIFIED REGISTERED

## 2021-01-05 PROCEDURE — 99223 1ST HOSP IP/OBS HIGH 75: CPT | Performed by: HOSPITALIST

## 2021-01-05 PROCEDURE — 10000002 HB ROOM CHARGE MED SURG

## 2021-01-05 PROCEDURE — 0SGG04Z FUSION OF LEFT ANKLE JOINT WITH INTERNAL FIXATION DEVICE, OPEN APPROACH: ICD-10-PCS | Performed by: ORTHOPAEDIC SURGERY

## 2021-01-05 PROCEDURE — 0SGG0KZ FUSION OF LEFT ANKLE JOINT WITH NONAUTOLOGOUS TISSUE SUBSTITUTE, OPEN APPROACH: ICD-10-PCS | Performed by: ORTHOPAEDIC SURGERY

## 2021-01-05 PROCEDURE — C1769 GUIDE WIRE: HCPCS | Performed by: ORTHOPAEDIC SURGERY

## 2021-01-05 PROCEDURE — 10002803 HB RX 637: Performed by: HOSPITALIST

## 2021-01-05 PROCEDURE — 10004451 HB PACU RECOVERY 1ST 30 MINUTES: Performed by: ORTHOPAEDIC SURGERY

## 2021-01-05 PROCEDURE — 10006031 HB ROOM CHARGE TELEMETRY

## 2021-01-05 PROCEDURE — 97161 PT EVAL LOW COMPLEX 20 MIN: CPT

## 2021-01-05 PROCEDURE — 13000119 HB ORTHO MAJOR COMPLEX CASE EA ADD MINUTE: Performed by: ORTHOPAEDIC SURGERY

## 2021-01-05 PROCEDURE — 10002800 HB RX 250 W HCPCS: Performed by: ORTHOPAEDIC SURGERY

## 2021-01-05 PROCEDURE — 10006027 HB SUPPLY 278: Performed by: ORTHOPAEDIC SURGERY

## 2021-01-05 PROCEDURE — 10004651 HB RX, NO CHARGE ITEM: Performed by: PHYSICIAN ASSISTANT

## 2021-01-05 PROCEDURE — 10004452 HB PACU ADDL 30 MINUTES: Performed by: ORTHOPAEDIC SURGERY

## 2021-01-05 PROCEDURE — 10006023 HB SUPPLY 272: Performed by: ORTHOPAEDIC SURGERY

## 2021-01-05 PROCEDURE — 10002807 HB RX 258: Performed by: NURSE ANESTHETIST, CERTIFIED REGISTERED

## 2021-01-05 PROCEDURE — 10002803 HB RX 637: Performed by: PHYSICIAN ASSISTANT

## 2021-01-05 PROCEDURE — 3E0U0GB INTRODUCTION OF RECOMBINANT BONE MORPHOGENETIC PROTEIN INTO JOINTS, OPEN APPROACH: ICD-10-PCS | Performed by: ORTHOPAEDIC SURGERY

## 2021-01-05 PROCEDURE — 97116 GAIT TRAINING THERAPY: CPT

## 2021-01-05 DEVICE — IMPLANTABLE DEVICE: Type: IMPLANTABLE DEVICE | Site: FOOT | Status: FUNCTIONAL

## 2021-01-05 DEVICE — C BONE PUTTY WITH DBM AND CANCELLOUS BONE CHIPS
Type: IMPLANTABLE DEVICE | Site: ANKLE | Status: FUNCTIONAL
Brand: ALLOMATRIX

## 2021-01-05 DEVICE — IMPLANTABLE DEVICE: Type: IMPLANTABLE DEVICE | Site: ANKLE | Status: FUNCTIONAL

## 2021-01-05 RX ORDER — HUMAN INSULIN 100 [IU]/ML
INJECTION, SOLUTION SUBCUTANEOUS
Status: DISCONTINUED | OUTPATIENT
Start: 2021-01-05 | End: 2021-01-05

## 2021-01-05 RX ORDER — TAMSULOSIN HYDROCHLORIDE 0.4 MG/1
0.4 CAPSULE ORAL DAILY PRN
Status: DISCONTINUED | OUTPATIENT
Start: 2021-01-05 | End: 2021-01-07 | Stop reason: HOSPADM

## 2021-01-05 RX ORDER — DIPHENHYDRAMINE HYDROCHLORIDE 50 MG/ML
12.5 INJECTION INTRAMUSCULAR; INTRAVENOUS
Status: DISCONTINUED | OUTPATIENT
Start: 2021-01-05 | End: 2021-01-05

## 2021-01-05 RX ORDER — DEXTROSE MONOHYDRATE 25 G/50ML
25 INJECTION, SOLUTION INTRAVENOUS PRN
Status: DISCONTINUED | OUTPATIENT
Start: 2021-01-05 | End: 2021-01-05

## 2021-01-05 RX ORDER — NALOXONE HCL 0.4 MG/ML
0.2 VIAL (ML) INJECTION EVERY 5 MIN PRN
Status: DISCONTINUED | OUTPATIENT
Start: 2021-01-05 | End: 2021-01-05

## 2021-01-05 RX ORDER — MIDAZOLAM HYDROCHLORIDE 2 MG/2ML
INJECTION, SOLUTION INTRAMUSCULAR; INTRAVENOUS
Status: COMPLETED
Start: 2021-01-05 | End: 2021-01-05

## 2021-01-05 RX ORDER — HYDROCODONE BITARTRATE AND ACETAMINOPHEN 5; 325 MG/1; MG/1
2 TABLET ORAL EVERY 4 HOURS PRN
Status: DISCONTINUED | OUTPATIENT
Start: 2021-01-05 | End: 2021-01-07 | Stop reason: HOSPADM

## 2021-01-05 RX ORDER — MIDAZOLAM HYDROCHLORIDE 1 MG/ML
INJECTION, SOLUTION INTRAMUSCULAR; INTRAVENOUS PRN
Status: DISCONTINUED | OUTPATIENT
Start: 2021-01-05 | End: 2021-01-05

## 2021-01-05 RX ORDER — BISACODYL 10 MG
10 SUPPOSITORY, RECTAL RECTAL DAILY PRN
Status: DISCONTINUED | OUTPATIENT
Start: 2021-01-05 | End: 2021-01-07 | Stop reason: HOSPADM

## 2021-01-05 RX ORDER — DULOXETIN HYDROCHLORIDE 30 MG/1
30 CAPSULE, DELAYED RELEASE ORAL EVERY MORNING
Status: DISCONTINUED | OUTPATIENT
Start: 2021-01-06 | End: 2021-01-07 | Stop reason: HOSPADM

## 2021-01-05 RX ORDER — SODIUM CHLORIDE 9 MG/ML
INJECTION, SOLUTION INTRAVENOUS CONTINUOUS
Status: DISCONTINUED | OUTPATIENT
Start: 2021-01-05 | End: 2021-01-05

## 2021-01-05 RX ORDER — DIAZEPAM 2 MG/1
2 TABLET ORAL EVERY 8 HOURS PRN
Status: DISCONTINUED | OUTPATIENT
Start: 2021-01-05 | End: 2021-01-07 | Stop reason: HOSPADM

## 2021-01-05 RX ORDER — HYDRALAZINE HYDROCHLORIDE 25 MG/1
25 TABLET, FILM COATED ORAL ONCE
Status: DISCONTINUED | OUTPATIENT
Start: 2021-01-05 | End: 2021-01-07 | Stop reason: HOSPADM

## 2021-01-05 RX ORDER — AMOXICILLIN 250 MG
2 CAPSULE ORAL 2 TIMES DAILY PRN
Status: DISCONTINUED | OUTPATIENT
Start: 2021-01-05 | End: 2021-01-07 | Stop reason: HOSPADM

## 2021-01-05 RX ORDER — SODIUM CHLORIDE, SODIUM LACTATE, POTASSIUM CHLORIDE, CALCIUM CHLORIDE 600; 310; 30; 20 MG/100ML; MG/100ML; MG/100ML; MG/100ML
INJECTION, SOLUTION INTRAVENOUS CONTINUOUS
Status: DISCONTINUED | OUTPATIENT
Start: 2021-01-05 | End: 2021-01-05

## 2021-01-05 RX ORDER — NICOTINE 21 MG/24HR
1 PATCH, TRANSDERMAL 24 HOURS TRANSDERMAL DAILY
Status: DISCONTINUED | OUTPATIENT
Start: 2021-01-05 | End: 2021-01-07 | Stop reason: HOSPADM

## 2021-01-05 RX ORDER — 0.9 % SODIUM CHLORIDE 0.9 %
2 VIAL (ML) INJECTION EVERY 12 HOURS SCHEDULED
Status: DISCONTINUED | OUTPATIENT
Start: 2021-01-05 | End: 2021-01-05

## 2021-01-05 RX ORDER — HYDRALAZINE HYDROCHLORIDE 20 MG/ML
5 INJECTION INTRAMUSCULAR; INTRAVENOUS EVERY 10 MIN PRN
Status: DISCONTINUED | OUTPATIENT
Start: 2021-01-05 | End: 2021-01-05 | Stop reason: HOSPADM

## 2021-01-05 RX ORDER — ROPIVACAINE HYDROCHLORIDE 5 MG/ML
INJECTION, SOLUTION EPIDURAL; INFILTRATION; PERINEURAL
Status: DISPENSED
Start: 2021-01-05 | End: 2021-01-06

## 2021-01-05 RX ORDER — GABAPENTIN 300 MG/1
300 CAPSULE ORAL 2 TIMES DAILY
Status: DISCONTINUED | OUTPATIENT
Start: 2021-01-05 | End: 2021-01-07 | Stop reason: HOSPADM

## 2021-01-05 RX ORDER — LIDOCAINE HYDROCHLORIDE 10 MG/ML
INJECTION, SOLUTION EPIDURAL; INFILTRATION; INTRACAUDAL; PERINEURAL
Status: DISPENSED
Start: 2021-01-05 | End: 2021-01-06

## 2021-01-05 RX ORDER — HYDRALAZINE HYDROCHLORIDE 20 MG/ML
10 INJECTION INTRAMUSCULAR; INTRAVENOUS EVERY 6 HOURS PRN
Status: DISCONTINUED | OUTPATIENT
Start: 2021-01-05 | End: 2021-01-07 | Stop reason: HOSPADM

## 2021-01-05 RX ORDER — NICOTINE POLACRILEX 4 MG
30 LOZENGE BUCCAL
Status: DISCONTINUED | OUTPATIENT
Start: 2021-01-05 | End: 2021-01-05

## 2021-01-05 RX ORDER — DEXTROSE MONOHYDRATE 50 MG/ML
INJECTION, SOLUTION INTRAVENOUS CONTINUOUS PRN
Status: DISCONTINUED | OUTPATIENT
Start: 2021-01-05 | End: 2021-01-05

## 2021-01-05 RX ORDER — CARBAMAZEPINE 100 MG/1
100 TABLET, EXTENDED RELEASE ORAL EVERY MORNING
Status: DISCONTINUED | OUTPATIENT
Start: 2021-01-05 | End: 2021-01-07 | Stop reason: HOSPADM

## 2021-01-05 RX ORDER — LIDOCAINE HYDROCHLORIDE 10 MG/ML
5-10 INJECTION, SOLUTION INFILTRATION; PERINEURAL PRN
Status: DISCONTINUED | OUTPATIENT
Start: 2021-01-05 | End: 2021-01-05

## 2021-01-05 RX ORDER — ONDANSETRON 2 MG/ML
4 INJECTION INTRAMUSCULAR; INTRAVENOUS 2 TIMES DAILY PRN
Status: DISCONTINUED | OUTPATIENT
Start: 2021-01-05 | End: 2021-01-05

## 2021-01-05 RX ADMIN — SODIUM CHLORIDE, SODIUM LACTATE, POTASSIUM CHLORIDE, AND CALCIUM CHLORIDE: .6; .31; .03; .02 INJECTION, SOLUTION INTRAVENOUS at 12:11

## 2021-01-05 RX ADMIN — CEFAZOLIN SODIUM 2000 MG: 300 INJECTION, POWDER, LYOPHILIZED, FOR SOLUTION INTRAVENOUS at 18:47

## 2021-01-05 RX ADMIN — HYDROCODONE BITARTRATE AND ACETAMINOPHEN 2 TABLET: 5; 325 TABLET ORAL at 18:46

## 2021-01-05 RX ADMIN — Medication 1 PATCH: at 18:47

## 2021-01-05 RX ADMIN — MIDAZOLAM HYDROCHLORIDE 2 MG: 1 INJECTION, SOLUTION INTRAMUSCULAR; INTRAVENOUS at 12:42

## 2021-01-05 RX ADMIN — FENTANYL CITRATE 25 MCG: 50 INJECTION, SOLUTION INTRAMUSCULAR; INTRAVENOUS at 12:42

## 2021-01-05 RX ADMIN — FENTANYL CITRATE 25 MCG: 50 INJECTION, SOLUTION INTRAMUSCULAR; INTRAVENOUS at 13:15

## 2021-01-05 RX ADMIN — TAMSULOSIN HYDROCHLORIDE 0.4 MG: 0.4 CAPSULE ORAL at 20:19

## 2021-01-05 RX ADMIN — ASPIRIN 325 MG: 325 TABLET ORAL at 18:46

## 2021-01-05 RX ADMIN — GABAPENTIN 300 MG: 300 CAPSULE ORAL at 20:19

## 2021-01-05 RX ADMIN — CEFAZOLIN SODIUM 2000 MG: 300 INJECTION, POWDER, LYOPHILIZED, FOR SOLUTION INTRAVENOUS at 13:17

## 2021-01-05 RX ADMIN — PROPOFOL 140 MCG/KG/MIN: 10 INJECTION, EMULSION INTRAVENOUS at 13:15

## 2021-01-05 SDOH — HEALTH STABILITY: MENTAL HEALTH: HOW OFTEN DO YOU HAVE A DRINK CONTAINING ALCOHOL?: NEVER

## 2021-01-05 ASSESSMENT — ACTIVITIES OF DAILY LIVING (ADL)
RECENT_DECLINE_ADL: YES, DECLINE IN AMBULATION/TRANSFERRING, COLLABORATE WITH PROVIDER (T)
MOBILITY_ASSIST_DEVICES: CANE;STANDARD WALKER
ADL_SHORT_OF_BREATH: NO
ADL_SCORE: 12
CHRONIC_PAIN_PRESENT: NO
ADL_BEFORE_ADMISSION: INDEPENDENT

## 2021-01-05 ASSESSMENT — COGNITIVE AND FUNCTIONAL STATUS - GENERAL
ARE YOU DEAF OR DO YOU HAVE SERIOUS DIFFICULTY  HEARING: NO
ARE YOU BLIND OR DO YOU HAVE SERIOUS DIFFICULTY SEEING, EVEN WHEN WEARING GLASSES: NO
BECAUSE OF A PHYSICAL, MENTAL, OR EMOTIONAL CONDITION, DO YOU HAVE DIFFICULTY DOING ERRANDS ALONE: YES
DO YOU HAVE DIFFICULTY DRESSING OR BATHING: NO
BASIC_MOBILITY_CONVERTED_SCORE: 41.05
BASIC_MOBILITY_RAW_SCORE: 18
BECAUSE OF A PHYSICAL, MENTAL, OR EMOTIONAL CONDITION, DO YOU HAVE SERIOUS DIFFICULTY CONCENTRATING, REMEMBERING OR MAKING DECISIONS: NO
DO YOU HAVE SERIOUS DIFFICULTY WALKING OR CLIMBING STAIRS: YES

## 2021-01-05 ASSESSMENT — PAIN SCALES - GENERAL
PAINLEVEL_OUTOF10: 7
PAINLEVEL_OUTOF10: 0
PAINLEVEL_OUTOF10: 0
PAINLEVEL_OUTOF10: 6
PAINLEVEL_OUTOF10: 0
PAINLEVEL_OUTOF10: 1

## 2021-01-05 ASSESSMENT — LIFESTYLE VARIABLES
PACK_YEARS: 100
ALCOHOL_USE_STATUS: NO OR LOW RISK WITH VALIDATED TOOL
HOW OFTEN DO YOU HAVE A DRINK CONTAINING ALCOHOL: NEVER
HOW OFTEN DO YOU HAVE 6 OR MORE DRINKS ON ONE OCCASION: NEVER
HOW MANY STANDARD DRINKS CONTAINING ALCOHOL DO YOU HAVE ON A TYPICAL DAY: 0,1 OR 2
AUDIT-C TOTAL SCORE: 0
SMOKING_YEARS: 50
SMOKING_STATUS: CURRENT

## 2021-01-05 ASSESSMENT — COPD QUESTIONNAIRES: COPD: 1

## 2021-01-05 ASSESSMENT — ENCOUNTER SYMPTOMS
PAIN SEVERITY NOW: 5
EXERCISE TOLERANCE: POOR (<4 METS)

## 2021-01-06 LAB
GLUCOSE BLDC GLUCOMTR-MCNC: 106 MG/DL (ref 70–99)
HCT VFR BLD CALC: 41.2 % (ref 39–51)
HGB BLD-MCNC: 13.8 G/DL (ref 13–17)
RAINBOW EXTRA TUBES HOLD SPECIMEN: NORMAL

## 2021-01-06 PROCEDURE — 97167 OT EVAL HIGH COMPLEX 60 MIN: CPT

## 2021-01-06 PROCEDURE — 99233 SBSQ HOSP IP/OBS HIGH 50: CPT | Performed by: STUDENT IN AN ORGANIZED HEALTH CARE EDUCATION/TRAINING PROGRAM

## 2021-01-06 PROCEDURE — 99406 BEHAV CHNG SMOKING 3-10 MIN: CPT | Performed by: STUDENT IN AN ORGANIZED HEALTH CARE EDUCATION/TRAINING PROGRAM

## 2021-01-06 PROCEDURE — 10002803 HB RX 637: Performed by: HOSPITALIST

## 2021-01-06 PROCEDURE — 10002800 HB RX 250 W HCPCS: Performed by: PHYSICIAN ASSISTANT

## 2021-01-06 PROCEDURE — 10006031 HB ROOM CHARGE TELEMETRY

## 2021-01-06 PROCEDURE — 82962 GLUCOSE BLOOD TEST: CPT

## 2021-01-06 PROCEDURE — 10002803 HB RX 637: Performed by: PHYSICIAN ASSISTANT

## 2021-01-06 PROCEDURE — 36415 COLL VENOUS BLD VENIPUNCTURE: CPT | Performed by: PHYSICIAN ASSISTANT

## 2021-01-06 PROCEDURE — 10004651 HB RX, NO CHARGE ITEM: Performed by: PHYSICIAN ASSISTANT

## 2021-01-06 PROCEDURE — 10004281 HB COUNTER-STAFF TIME PER 15 MIN

## 2021-01-06 PROCEDURE — 97116 GAIT TRAINING THERAPY: CPT

## 2021-01-06 PROCEDURE — 97530 THERAPEUTIC ACTIVITIES: CPT

## 2021-01-06 PROCEDURE — 85014 HEMATOCRIT: CPT | Performed by: PHYSICIAN ASSISTANT

## 2021-01-06 PROCEDURE — 10000002 HB ROOM CHARGE MED SURG

## 2021-01-06 RX ORDER — HYDROCODONE BITARTRATE AND ACETAMINOPHEN 10; 325 MG/1; MG/1
1-2 TABLET ORAL EVERY 4 HOURS PRN
Qty: 30 TABLET | Refills: 0 | OUTPATIENT
Start: 2021-01-06 | End: 2021-07-11

## 2021-01-06 RX ORDER — POLYETHYLENE GLYCOL 3350 17 G/17G
17 POWDER, FOR SOLUTION ORAL DAILY
Status: DISCONTINUED | OUTPATIENT
Start: 2021-01-06 | End: 2021-01-07 | Stop reason: HOSPADM

## 2021-01-06 RX ORDER — MULTIVITAMIN,THER AND MINERALS
1 TABLET ORAL DAILY
COMMUNITY

## 2021-01-06 RX ORDER — ASPIRIN 325 MG
325 TABLET, DELAYED RELEASE (ENTERIC COATED) ORAL DAILY
Qty: 30 TABLET | Refills: 0 | Status: SHIPPED | OUTPATIENT
Start: 2021-01-06 | End: 2022-01-19 | Stop reason: ALTCHOICE

## 2021-01-06 RX ADMIN — POLYETHYLENE GLYCOL 3350 17 G: 17 POWDER, FOR SOLUTION ORAL at 12:31

## 2021-01-06 RX ADMIN — ASPIRIN 325 MG: 325 TABLET ORAL at 09:52

## 2021-01-06 RX ADMIN — CEFAZOLIN SODIUM 2000 MG: 300 INJECTION, POWDER, LYOPHILIZED, FOR SOLUTION INTRAVENOUS at 02:43

## 2021-01-06 RX ADMIN — TAMSULOSIN HYDROCHLORIDE 0.4 MG: 0.4 CAPSULE ORAL at 09:52

## 2021-01-06 RX ADMIN — GABAPENTIN 300 MG: 300 CAPSULE ORAL at 09:52

## 2021-01-06 RX ADMIN — HYDROCODONE BITARTRATE AND ACETAMINOPHEN 2 TABLET: 5; 325 TABLET ORAL at 00:12

## 2021-01-06 RX ADMIN — DULOXETINE HYDROCHLORIDE 30 MG: 30 CAPSULE, DELAYED RELEASE ORAL at 07:42

## 2021-01-06 RX ADMIN — CARBAMAZEPINE 100 MG: 100 TABLET, EXTENDED RELEASE ORAL at 07:42

## 2021-01-06 RX ADMIN — HYDROCODONE BITARTRATE AND ACETAMINOPHEN 2 TABLET: 5; 325 TABLET ORAL at 18:09

## 2021-01-06 RX ADMIN — GABAPENTIN 300 MG: 300 CAPSULE ORAL at 20:02

## 2021-01-06 RX ADMIN — HYDROCODONE BITARTRATE AND ACETAMINOPHEN 2 TABLET: 5; 325 TABLET ORAL at 21:56

## 2021-01-06 RX ADMIN — DIAZEPAM 2 MG: 2 TABLET ORAL at 20:02

## 2021-01-06 RX ADMIN — Medication 1 PATCH: at 09:53

## 2021-01-06 RX ADMIN — HYDROCODONE BITARTRATE AND ACETAMINOPHEN 2 TABLET: 5; 325 TABLET ORAL at 09:52

## 2021-01-06 ASSESSMENT — COGNITIVE AND FUNCTIONAL STATUS - GENERAL
HELP NEEDED DRESSING REGULAR UPPER BODY CLOTHING: A LITTLE
REMEMBERING WHERE THINGS ARE: A LITTLE
HELP NEEDED FOR PERSONAL GROOMING: A LITTLE
APPLIED_COGNITIVE_CONVERTED_SCORE: 41.76
TAKING CARE OF COMPLICATED TASKS: A LOT
HELP NEEDED DRESSING REGULAR LOWER BODY CLOTHING: A LITTLE
HELP NEEDED FOR TOILETING: TOTAL
BASIC_MOBILITY_CONVERTED_SCORE: 39.67
REMEMBERING 5 ERRANDS WITH NO LIST: A LITTLE
DAILY_ACTIVITY_RAW_SCORE: 16
HELP NEEDED FOR BATHING: A LITTLE
APPLIED_COGNITIVE_RAW_SCORE: 20
BASIC_MOBILITY_RAW_SCORE: 17
DAILY_ACTIVITY_CONVERTED_SCORE: 35.96

## 2021-01-06 ASSESSMENT — PAIN SCALES - GENERAL
PAINLEVEL_OUTOF10: 7
PAINLEVEL_OUTOF10: 5
PAINLEVEL_OUTOF10: 4
PAINLEVEL_OUTOF10: 7
PAINLEVEL_OUTOF10: 8
PAINLEVEL_OUTOF10: 7

## 2021-01-06 ASSESSMENT — ACTIVITIES OF DAILY LIVING (ADL)
PRIOR_ADL_BATHING: INDEPENDENT
PRIOR_ADL_TOILETING: INDEPENDENT
EATING: INDEPENDENT
GROOMING: INDEPENDENT
PRIOR_ADL: INDEPENDENT

## 2021-01-06 ASSESSMENT — ENCOUNTER SYMPTOMS: PAIN SEVERITY NOW: 5

## 2021-01-07 VITALS
HEART RATE: 77 BPM | SYSTOLIC BLOOD PRESSURE: 120 MMHG | RESPIRATION RATE: 18 BRPM | WEIGHT: 189.6 LBS | BODY MASS INDEX: 27.14 KG/M2 | DIASTOLIC BLOOD PRESSURE: 70 MMHG | TEMPERATURE: 98.1 F | OXYGEN SATURATION: 94 % | HEIGHT: 70 IN

## 2021-01-07 PROCEDURE — 10002803 HB RX 637: Performed by: HOSPITALIST

## 2021-01-07 PROCEDURE — 99233 SBSQ HOSP IP/OBS HIGH 50: CPT | Performed by: STUDENT IN AN ORGANIZED HEALTH CARE EDUCATION/TRAINING PROGRAM

## 2021-01-07 PROCEDURE — 10002803 HB RX 637: Performed by: PHYSICIAN ASSISTANT

## 2021-01-07 PROCEDURE — 10004651 HB RX, NO CHARGE ITEM: Performed by: PHYSICIAN ASSISTANT

## 2021-01-07 PROCEDURE — 10002800 HB RX 250 W HCPCS: Performed by: HOSPITALIST

## 2021-01-07 PROCEDURE — 97530 THERAPEUTIC ACTIVITIES: CPT

## 2021-01-07 PROCEDURE — 97116 GAIT TRAINING THERAPY: CPT

## 2021-01-07 PROCEDURE — 97535 SELF CARE MNGMENT TRAINING: CPT

## 2021-01-07 RX ADMIN — DULOXETINE HYDROCHLORIDE 30 MG: 30 CAPSULE, DELAYED RELEASE ORAL at 05:48

## 2021-01-07 RX ADMIN — HYDROCODONE BITARTRATE AND ACETAMINOPHEN 2 TABLET: 5; 325 TABLET ORAL at 01:57

## 2021-01-07 RX ADMIN — POLYETHYLENE GLYCOL 3350 17 G: 17 POWDER, FOR SOLUTION ORAL at 07:57

## 2021-01-07 RX ADMIN — ASPIRIN 325 MG: 325 TABLET ORAL at 07:57

## 2021-01-07 RX ADMIN — HYDROCODONE BITARTRATE AND ACETAMINOPHEN 2 TABLET: 5; 325 TABLET ORAL at 12:09

## 2021-01-07 RX ADMIN — CARBAMAZEPINE 100 MG: 100 TABLET, EXTENDED RELEASE ORAL at 05:48

## 2021-01-07 RX ADMIN — HYDRALAZINE HYDROCHLORIDE 10 MG: 20 INJECTION, SOLUTION INTRAMUSCULAR; INTRAVENOUS at 00:26

## 2021-01-07 RX ADMIN — HYDROCODONE BITARTRATE AND ACETAMINOPHEN 2 TABLET: 5; 325 TABLET ORAL at 05:49

## 2021-01-07 RX ADMIN — GABAPENTIN 300 MG: 300 CAPSULE ORAL at 07:57

## 2021-01-07 RX ADMIN — TAMSULOSIN HYDROCHLORIDE 0.4 MG: 0.4 CAPSULE ORAL at 08:00

## 2021-01-07 ASSESSMENT — COGNITIVE AND FUNCTIONAL STATUS - GENERAL
HELP NEEDED DRESSING REGULAR UPPER BODY CLOTHING: A LITTLE
REMEMBERING 5 ERRANDS WITH NO LIST: A LITTLE
HELP NEEDED FOR TOILETING: A LOT
TAKING CARE OF COMPLICATED TASKS: A LOT
REMEMBERING WHERE THINGS ARE: A LITTLE
DAILY_ACTIVITY_RAW_SCORE: 17
BASIC_MOBILITY_CONVERTED_SCORE: 39.67
HELP NEEDED FOR PERSONAL GROOMING: A LITTLE
APPLIED_COGNITIVE_CONVERTED_SCORE: 41.76
BASIC_MOBILITY_CONVERTED_SCORE: 39.67
APPLIED_COGNITIVE_RAW_SCORE: 20
HELP NEEDED FOR BATHING: A LITTLE
BASIC_MOBILITY_RAW_SCORE: 17
HELP NEEDED DRESSING REGULAR LOWER BODY CLOTHING: A LITTLE
DAILY_ACTIVITY_CONVERTED_SCORE: 37.26
BASIC_MOBILITY_RAW_SCORE: 17

## 2021-01-07 ASSESSMENT — PAIN SCALES - GENERAL
PAINLEVEL_OUTOF10: 7
PAINLEVEL_OUTOF10: 3
PAINLEVEL_OUTOF10: 8
PAINLEVEL_OUTOF10: 6
PAINLEVEL_OUTOF10: 4
PAINLEVEL_OUTOF10: 4

## 2021-01-07 ASSESSMENT — ENCOUNTER SYMPTOMS: PAIN SEVERITY NOW: 7

## 2021-01-07 ASSESSMENT — ACTIVITIES OF DAILY LIVING (ADL): HOME_MANAGEMENT_TIME_ENTRY: 29

## 2021-03-08 ENCOUNTER — HOSPITAL ENCOUNTER (OUTPATIENT)
Dept: WOUND CARE | Age: 66
Discharge: STILL ADMITTED OR ADMINISTRATIVE | End: 2021-03-08
Attending: STUDENT IN AN ORGANIZED HEALTH CARE EDUCATION/TRAINING PROGRAM

## 2021-03-08 VITALS
DIASTOLIC BLOOD PRESSURE: 90 MMHG | WEIGHT: 182.98 LBS | BODY MASS INDEX: 26.2 KG/M2 | HEIGHT: 70 IN | OXYGEN SATURATION: 98 % | RESPIRATION RATE: 18 BRPM | HEART RATE: 85 BPM | SYSTOLIC BLOOD PRESSURE: 160 MMHG

## 2021-03-08 DIAGNOSIS — I73.9 PVD (PERIPHERAL VASCULAR DISEASE) (CMD): ICD-10-CM

## 2021-03-08 DIAGNOSIS — S91.002A UNSPECIFIED OPEN WOUND, LEFT ANKLE, INITIAL ENCOUNTER: Primary | ICD-10-CM

## 2021-03-08 PROCEDURE — 99213 OFFICE O/P EST LOW 20 MIN: CPT

## 2021-03-08 SDOH — HEALTH STABILITY: MENTAL HEALTH: HOW OFTEN DO YOU HAVE A DRINK CONTAINING ALCOHOL?: NEVER

## 2021-03-08 ASSESSMENT — PAIN SCALES - GENERAL: PAINLEVEL_OUTOF10: 5

## 2021-03-09 DIAGNOSIS — Z23 NEED FOR VACCINATION: ICD-10-CM

## 2021-03-15 ENCOUNTER — HOSPITAL ENCOUNTER (OUTPATIENT)
Dept: WOUND CARE | Age: 66
Discharge: STILL ADMITTED OR ADMINISTRATIVE | End: 2021-03-15
Attending: STUDENT IN AN ORGANIZED HEALTH CARE EDUCATION/TRAINING PROGRAM

## 2021-03-15 ENCOUNTER — APPOINTMENT (OUTPATIENT)
Dept: WOUND CARE | Age: 66
End: 2021-03-15
Attending: STUDENT IN AN ORGANIZED HEALTH CARE EDUCATION/TRAINING PROGRAM

## 2021-03-15 VITALS
DIASTOLIC BLOOD PRESSURE: 93 MMHG | RESPIRATION RATE: 20 BRPM | OXYGEN SATURATION: 94 % | HEART RATE: 85 BPM | SYSTOLIC BLOOD PRESSURE: 165 MMHG

## 2021-03-15 DIAGNOSIS — S91.002A UNSPECIFIED OPEN WOUND, LEFT ANKLE, INITIAL ENCOUNTER: ICD-10-CM

## 2021-03-15 DIAGNOSIS — I73.9 PVD (PERIPHERAL VASCULAR DISEASE) (CMD): Primary | ICD-10-CM

## 2021-03-15 DIAGNOSIS — Z72.0 TOBACCO USE: ICD-10-CM

## 2021-03-15 PROCEDURE — 11042 DBRDMT SUBQ TIS 1ST 20SQCM/<: CPT

## 2021-03-15 SDOH — HEALTH STABILITY: MENTAL HEALTH: HOW OFTEN DO YOU HAVE A DRINK CONTAINING ALCOHOL?: NEVER

## 2021-03-15 ASSESSMENT — PAIN SCALES - GENERAL: PAINLEVEL_OUTOF10: 10

## 2021-03-22 ENCOUNTER — HOSPITAL ENCOUNTER (OUTPATIENT)
Dept: WOUND CARE | Age: 66
Discharge: STILL ADMITTED OR ADMINISTRATIVE | End: 2021-03-22
Attending: STUDENT IN AN ORGANIZED HEALTH CARE EDUCATION/TRAINING PROGRAM

## 2021-03-22 DIAGNOSIS — I73.9 PVD (PERIPHERAL VASCULAR DISEASE) (CMD): ICD-10-CM

## 2021-03-22 DIAGNOSIS — S91.002A UNSPECIFIED OPEN WOUND, LEFT ANKLE, INITIAL ENCOUNTER: Primary | ICD-10-CM

## 2021-03-22 PROCEDURE — 11042 DBRDMT SUBQ TIS 1ST 20SQCM/<: CPT

## 2021-03-22 SDOH — HEALTH STABILITY: MENTAL HEALTH: HOW OFTEN DO YOU HAVE A DRINK CONTAINING ALCOHOL?: NEVER

## 2021-03-29 ENCOUNTER — HOSPITAL ENCOUNTER (OUTPATIENT)
Dept: WOUND CARE | Age: 66
Discharge: STILL ADMITTED OR ADMINISTRATIVE | End: 2021-03-29
Attending: STUDENT IN AN ORGANIZED HEALTH CARE EDUCATION/TRAINING PROGRAM

## 2021-03-29 VITALS
HEART RATE: 85 BPM | SYSTOLIC BLOOD PRESSURE: 167 MMHG | RESPIRATION RATE: 18 BRPM | OXYGEN SATURATION: 99 % | DIASTOLIC BLOOD PRESSURE: 99 MMHG

## 2021-03-29 DIAGNOSIS — S91.002A UNSPECIFIED OPEN WOUND, LEFT ANKLE, INITIAL ENCOUNTER: Primary | ICD-10-CM

## 2021-03-29 DIAGNOSIS — Z72.0 TOBACCO USE: ICD-10-CM

## 2021-03-29 DIAGNOSIS — I73.9 PVD (PERIPHERAL VASCULAR DISEASE) (CMD): ICD-10-CM

## 2021-03-29 PROCEDURE — 11042 DBRDMT SUBQ TIS 1ST 20SQCM/<: CPT

## 2021-03-29 ASSESSMENT — PAIN SCALES - GENERAL: PAINLEVEL_OUTOF10: 0

## 2021-04-05 ENCOUNTER — HOSPITAL ENCOUNTER (OUTPATIENT)
Dept: WOUND CARE | Age: 66
Discharge: STILL ADMITTED OR ADMINISTRATIVE | End: 2021-04-05
Attending: STUDENT IN AN ORGANIZED HEALTH CARE EDUCATION/TRAINING PROGRAM

## 2021-04-05 VITALS
DIASTOLIC BLOOD PRESSURE: 81 MMHG | RESPIRATION RATE: 18 BRPM | OXYGEN SATURATION: 99 % | SYSTOLIC BLOOD PRESSURE: 130 MMHG | HEART RATE: 83 BPM

## 2021-04-05 DIAGNOSIS — I73.9 PVD (PERIPHERAL VASCULAR DISEASE) (CMD): ICD-10-CM

## 2021-04-05 DIAGNOSIS — S91.002A UNSPECIFIED OPEN WOUND, LEFT ANKLE, INITIAL ENCOUNTER: Primary | ICD-10-CM

## 2021-04-05 PROCEDURE — 11042 DBRDMT SUBQ TIS 1ST 20SQCM/<: CPT

## 2021-04-05 ASSESSMENT — PAIN SCALES - GENERAL: PAINLEVEL_OUTOF10: 0

## 2021-04-06 ENCOUNTER — TELEPHONE (OUTPATIENT)
Dept: SCHEDULING | Age: 66
End: 2021-04-06

## 2021-04-12 ENCOUNTER — HOSPITAL ENCOUNTER (OUTPATIENT)
Dept: WOUND CARE | Age: 66
Discharge: STILL ADMITTED OR ADMINISTRATIVE | End: 2021-04-12
Attending: STUDENT IN AN ORGANIZED HEALTH CARE EDUCATION/TRAINING PROGRAM

## 2021-04-12 DIAGNOSIS — I73.9 PVD (PERIPHERAL VASCULAR DISEASE) (CMD): ICD-10-CM

## 2021-04-12 DIAGNOSIS — S91.002A UNSPECIFIED OPEN WOUND, LEFT ANKLE, INITIAL ENCOUNTER: Primary | ICD-10-CM

## 2021-04-12 PROCEDURE — 11042 DBRDMT SUBQ TIS 1ST 20SQCM/<: CPT

## 2021-04-19 ENCOUNTER — HOSPITAL ENCOUNTER (OUTPATIENT)
Dept: WOUND CARE | Age: 66
Discharge: STILL ADMITTED OR ADMINISTRATIVE | End: 2021-04-19
Attending: STUDENT IN AN ORGANIZED HEALTH CARE EDUCATION/TRAINING PROGRAM

## 2021-04-19 VITALS
RESPIRATION RATE: 18 BRPM | DIASTOLIC BLOOD PRESSURE: 91 MMHG | SYSTOLIC BLOOD PRESSURE: 169 MMHG | HEART RATE: 85 BPM | OXYGEN SATURATION: 99 %

## 2021-04-19 DIAGNOSIS — S91.002A UNSPECIFIED OPEN WOUND, LEFT ANKLE, INITIAL ENCOUNTER: Primary | ICD-10-CM

## 2021-04-19 DIAGNOSIS — I73.9 PVD (PERIPHERAL VASCULAR DISEASE) (CMD): ICD-10-CM

## 2021-04-19 PROCEDURE — 11042 DBRDMT SUBQ TIS 1ST 20SQCM/<: CPT

## 2021-04-19 ASSESSMENT — PAIN SCALES - GENERAL: PAINLEVEL_OUTOF10: 0

## 2021-04-21 ENCOUNTER — IMAGING SERVICES (OUTPATIENT)
Dept: OTHER | Age: 66
End: 2021-04-21

## 2021-04-26 ENCOUNTER — HOSPITAL ENCOUNTER (OUTPATIENT)
Dept: WOUND CARE | Age: 66
Discharge: STILL ADMITTED OR ADMINISTRATIVE | End: 2021-04-26
Attending: STUDENT IN AN ORGANIZED HEALTH CARE EDUCATION/TRAINING PROGRAM

## 2021-04-26 DIAGNOSIS — S91.002A UNSPECIFIED OPEN WOUND, LEFT ANKLE, INITIAL ENCOUNTER: Primary | ICD-10-CM

## 2021-04-26 DIAGNOSIS — I73.9 PVD (PERIPHERAL VASCULAR DISEASE) (CMD): ICD-10-CM

## 2021-04-26 PROCEDURE — 11042 DBRDMT SUBQ TIS 1ST 20SQCM/<: CPT

## 2021-05-03 ENCOUNTER — HOSPITAL ENCOUNTER (OUTPATIENT)
Dept: WOUND CARE | Age: 66
Discharge: STILL ADMITTED OR ADMINISTRATIVE | End: 2021-05-03
Attending: STUDENT IN AN ORGANIZED HEALTH CARE EDUCATION/TRAINING PROGRAM

## 2021-05-03 VITALS
OXYGEN SATURATION: 96 % | SYSTOLIC BLOOD PRESSURE: 134 MMHG | HEART RATE: 78 BPM | RESPIRATION RATE: 17 BRPM | DIASTOLIC BLOOD PRESSURE: 91 MMHG

## 2021-05-03 DIAGNOSIS — S91.002A UNSPECIFIED OPEN WOUND, LEFT ANKLE, INITIAL ENCOUNTER: Primary | ICD-10-CM

## 2021-05-03 PROCEDURE — 11042 DBRDMT SUBQ TIS 1ST 20SQCM/<: CPT

## 2021-05-03 ASSESSMENT — PAIN SCALES - GENERAL: PAINLEVEL_OUTOF10: 1

## 2021-05-04 ENCOUNTER — IMAGING SERVICES (OUTPATIENT)
Dept: OTHER | Age: 66
End: 2021-05-04

## 2021-05-10 ENCOUNTER — HOSPITAL ENCOUNTER (OUTPATIENT)
Dept: WOUND CARE | Age: 66
Discharge: STILL ADMITTED OR ADMINISTRATIVE | End: 2021-05-10
Attending: STUDENT IN AN ORGANIZED HEALTH CARE EDUCATION/TRAINING PROGRAM

## 2021-05-10 VITALS
OXYGEN SATURATION: 98 % | DIASTOLIC BLOOD PRESSURE: 65 MMHG | HEART RATE: 69 BPM | RESPIRATION RATE: 16 BRPM | SYSTOLIC BLOOD PRESSURE: 147 MMHG

## 2021-05-10 DIAGNOSIS — S91.002A UNSPECIFIED OPEN WOUND, LEFT ANKLE, INITIAL ENCOUNTER: Primary | ICD-10-CM

## 2021-05-10 DIAGNOSIS — I73.9 PVD (PERIPHERAL VASCULAR DISEASE) (CMD): ICD-10-CM

## 2021-05-10 PROCEDURE — 11042 DBRDMT SUBQ TIS 1ST 20SQCM/<: CPT

## 2021-05-20 ENCOUNTER — HOSPITAL ENCOUNTER (OUTPATIENT)
Age: 66
End: 2021-05-20
Attending: ORTHOPAEDIC SURGERY | Admitting: ORTHOPAEDIC SURGERY

## 2021-05-24 ENCOUNTER — HOSPITAL ENCOUNTER (OUTPATIENT)
Dept: WOUND CARE | Age: 66
Discharge: STILL ADMITTED OR ADMINISTRATIVE | End: 2021-05-24
Attending: STUDENT IN AN ORGANIZED HEALTH CARE EDUCATION/TRAINING PROGRAM

## 2021-05-24 VITALS
OXYGEN SATURATION: 97 % | DIASTOLIC BLOOD PRESSURE: 73 MMHG | SYSTOLIC BLOOD PRESSURE: 157 MMHG | RESPIRATION RATE: 18 BRPM | HEART RATE: 78 BPM

## 2021-05-24 DIAGNOSIS — S91.002A UNSPECIFIED OPEN WOUND, LEFT ANKLE, INITIAL ENCOUNTER: ICD-10-CM

## 2021-05-24 DIAGNOSIS — I73.9 PVD (PERIPHERAL VASCULAR DISEASE) (CMD): Primary | ICD-10-CM

## 2021-05-24 PROCEDURE — 99211 OFF/OP EST MAY X REQ PHY/QHP: CPT

## 2021-05-24 ASSESSMENT — PAIN SCALES - GENERAL: PAINLEVEL_OUTOF10: 0

## 2021-07-07 ENCOUNTER — LAB SERVICES (OUTPATIENT)
Dept: LAB | Age: 66
End: 2021-07-07
Attending: NURSE PRACTITIONER

## 2021-07-07 ENCOUNTER — HOSPITAL ENCOUNTER (OUTPATIENT)
Dept: GENERAL RADIOLOGY | Age: 66
Discharge: HOME | End: 2021-07-07
Attending: NURSE PRACTITIONER

## 2021-07-07 DIAGNOSIS — M54.9 DORSALGIA: ICD-10-CM

## 2021-07-07 DIAGNOSIS — Z01.818 PREOP EXAMINATION: ICD-10-CM

## 2021-07-07 DIAGNOSIS — Z01.818 OTHER SPECIFIED PRE-OPERATIVE EXAMINATION: Primary | ICD-10-CM

## 2021-07-07 DIAGNOSIS — R20.2 PARESTHESIA: ICD-10-CM

## 2021-07-07 DIAGNOSIS — M25.572 LEFT ANKLE PAIN, UNSPECIFIED CHRONICITY: ICD-10-CM

## 2021-07-07 DIAGNOSIS — M25.572 PAIN, JOINT, ANKLE, LEFT: ICD-10-CM

## 2021-07-07 DIAGNOSIS — J43.8 OTHER EMPHYSEMA (CMD): ICD-10-CM

## 2021-07-07 DIAGNOSIS — F17.210 CIGARETTE SMOKER: ICD-10-CM

## 2021-07-07 DIAGNOSIS — M25.572 LEFT ANKLE PAIN: ICD-10-CM

## 2021-07-07 LAB
ALBUMIN SERPL-MCNC: 3.7 G/DL (ref 3.6–5.1)
ALBUMIN/GLOB SERPL: 1 {RATIO} (ref 1–2.4)
ALP SERPL-CCNC: 101 UNITS/L (ref 45–117)
ALT SERPL-CCNC: 19 UNITS/L
AMORPH SED URNS QL MICRO: PRESENT
ANION GAP SERPL CALC-SCNC: 11 MMOL/L (ref 10–20)
APPEARANCE UR: ABNORMAL
AST SERPL-CCNC: 17 UNITS/L
BACTERIA #/AREA URNS HPF: ABNORMAL /HPF
BASOPHILS # BLD: 0.1 K/MCL (ref 0–0.3)
BASOPHILS NFR BLD: 1 %
BILIRUB SERPL-MCNC: 0.2 MG/DL (ref 0.2–1)
BILIRUB UR QL STRIP: NEGATIVE
BUN SERPL-MCNC: 18 MG/DL (ref 6–20)
BUN/CREAT SERPL: 17 (ref 7–25)
CALCIUM SERPL-MCNC: 9.2 MG/DL (ref 8.4–10.2)
CHLORIDE SERPL-SCNC: 104 MMOL/L (ref 98–107)
CO2 SERPL-SCNC: 28 MMOL/L (ref 21–32)
COLOR UR: YELLOW
CREAT SERPL-MCNC: 1.07 MG/DL (ref 0.67–1.17)
DEPRECATED RDW RBC: 41.8 FL (ref 39–50)
EOSINOPHIL # BLD: 0.5 K/MCL (ref 0–0.5)
EOSINOPHIL NFR BLD: 6 %
ERYTHROCYTE [DISTWIDTH] IN BLOOD: 12.3 % (ref 11–15)
FASTING DURATION TIME PATIENT: ABNORMAL H
GFR SERPLBLD BASED ON 1.73 SQ M-ARVRAT: 72 ML/MIN/1.73M2
GLOBULIN SER-MCNC: 3.8 G/DL (ref 2–4)
GLUCOSE SERPL-MCNC: 84 MG/DL (ref 65–99)
GLUCOSE UR STRIP-MCNC: NEGATIVE MG/DL
HCT VFR BLD CALC: 44.1 % (ref 39–51)
HGB BLD-MCNC: 14.6 G/DL (ref 13–17)
HGB UR QL STRIP: ABNORMAL
HYALINE CASTS #/AREA URNS LPF: ABNORMAL /LPF
IMM GRANULOCYTES # BLD AUTO: 0 K/MCL (ref 0–0.2)
IMM GRANULOCYTES # BLD: 0 %
KETONES UR STRIP-MCNC: NEGATIVE MG/DL
LEUKOCYTE ESTERASE UR QL STRIP: NEGATIVE
LYMPHOCYTES # BLD: 2.1 K/MCL (ref 1–4)
LYMPHOCYTES NFR BLD: 26 %
MCH RBC QN AUTO: 30.6 PG (ref 26–34)
MCHC RBC AUTO-ENTMCNC: 33.1 G/DL (ref 32–36.5)
MCV RBC AUTO: 92.5 FL (ref 78–100)
MONOCYTES # BLD: 1 K/MCL (ref 0.3–0.9)
MONOCYTES NFR BLD: 13 %
NEUTROPHILS # BLD: 4.4 K/MCL (ref 1.8–7.7)
NEUTROPHILS NFR BLD: 54 %
NITRITE UR QL STRIP: NEGATIVE
NRBC BLD MANUAL-RTO: 0 /100 WBC
PH UR STRIP: 5.5 [PH] (ref 5–7)
PLATELET # BLD AUTO: 206 K/MCL (ref 140–450)
POTASSIUM SERPL-SCNC: 4.4 MMOL/L (ref 3.4–5.1)
PROT SERPL-MCNC: 7.5 G/DL (ref 6.4–8.2)
PROT UR STRIP-MCNC: NEGATIVE MG/DL
RBC # BLD: 4.77 MIL/MCL (ref 4.5–5.9)
RBC #/AREA URNS HPF: ABNORMAL /HPF
SODIUM SERPL-SCNC: 139 MMOL/L (ref 135–145)
SP GR UR STRIP: 1.02 (ref 1–1.03)
SQUAMOUS #/AREA URNS HPF: ABNORMAL /HPF
UROBILINOGEN UR STRIP-MCNC: 0.2 MG/DL
WBC # BLD: 8.1 K/MCL (ref 4.2–11)
WBC #/AREA URNS HPF: ABNORMAL /HPF

## 2021-07-07 PROCEDURE — 85025 COMPLETE CBC W/AUTO DIFF WBC: CPT

## 2021-07-07 PROCEDURE — 81001 URINALYSIS AUTO W/SCOPE: CPT

## 2021-07-07 PROCEDURE — 80053 COMPREHEN METABOLIC PANEL: CPT

## 2021-07-07 PROCEDURE — 71046 X-RAY EXAM CHEST 2 VIEWS: CPT

## 2021-07-08 ENCOUNTER — HOSPITAL ENCOUNTER (OUTPATIENT)
Dept: LAB | Age: 66
Discharge: HOME | End: 2021-07-08
Attending: NURSE PRACTITIONER

## 2021-07-08 PROCEDURE — 87086 URINE CULTURE/COLONY COUNT: CPT

## 2021-07-10 LAB — BACTERIA UR CULT: NORMAL

## 2021-07-11 ENCOUNTER — HOSPITAL ENCOUNTER (EMERGENCY)
Age: 66
Discharge: HOME | End: 2021-07-11
Attending: EMERGENCY MEDICINE

## 2021-07-11 ENCOUNTER — APPOINTMENT (OUTPATIENT)
Dept: GENERAL RADIOLOGY | Age: 66
End: 2021-07-11
Attending: EMERGENCY MEDICINE

## 2021-07-11 VITALS
SYSTOLIC BLOOD PRESSURE: 138 MMHG | OXYGEN SATURATION: 98 % | HEART RATE: 78 BPM | DIASTOLIC BLOOD PRESSURE: 93 MMHG | TEMPERATURE: 98.1 F | RESPIRATION RATE: 16 BRPM

## 2021-07-11 DIAGNOSIS — M25.562 ACUTE PAIN OF LEFT KNEE: Primary | ICD-10-CM

## 2021-07-11 PROCEDURE — 10003579 HB TRAUMA W/O CRITICAL CARE

## 2021-07-11 PROCEDURE — 73562 X-RAY EXAM OF KNEE 3: CPT

## 2021-07-11 PROCEDURE — 73590 X-RAY EXAM OF LOWER LEG: CPT

## 2021-07-11 PROCEDURE — 10002803 HB RX 637: Performed by: EMERGENCY MEDICINE

## 2021-07-11 PROCEDURE — 99284 EMERGENCY DEPT VISIT MOD MDM: CPT

## 2021-07-11 PROCEDURE — 73610 X-RAY EXAM OF ANKLE: CPT

## 2021-07-11 RX ORDER — HYDROCODONE BITARTRATE AND ACETAMINOPHEN 5; 325 MG/1; MG/1
1 TABLET ORAL EVERY 4 HOURS PRN
Qty: 12 TABLET | Refills: 0 | Status: SHIPPED | OUTPATIENT
Start: 2021-07-11

## 2021-07-11 RX ORDER — HYDROCODONE BITARTRATE AND ACETAMINOPHEN 5; 325 MG/1; MG/1
1 TABLET ORAL ONCE
Status: COMPLETED | OUTPATIENT
Start: 2021-07-11 | End: 2021-07-11

## 2021-07-11 RX ADMIN — HYDROCODONE BITARTRATE AND ACETAMINOPHEN 1 TABLET: 5; 325 TABLET ORAL at 13:40

## 2021-07-11 ASSESSMENT — ENCOUNTER SYMPTOMS
GASTROINTESTINAL NEGATIVE: 1
HEMATOLOGIC/LYMPHATIC NEGATIVE: 1
RESPIRATORY NEGATIVE: 1
PHOTOPHOBIA: 0
ENDOCRINE NEGATIVE: 1
NAUSEA: 0
BLOOD IN STOOL: 0
CONSTITUTIONAL NEGATIVE: 1
DIARRHEA: 0
EYES NEGATIVE: 1
VOMITING: 0
ABDOMINAL PAIN: 0
FEVER: 0
NEUROLOGICAL NEGATIVE: 1
COUGH: 0
NUMBNESS: 0
HEADACHES: 0
SHORTNESS OF BREATH: 0
DIZZINESS: 0
PSYCHIATRIC NEGATIVE: 1
ALLERGIC/IMMUNOLOGIC NEGATIVE: 1

## 2021-07-23 ASSESSMENT — COGNITIVE AND FUNCTIONAL STATUS - GENERAL
ARE YOU BLIND OR DO YOU HAVE SERIOUS DIFFICULTY SEEING, EVEN WHEN WEARING GLASSES: NO
ARE YOU DEAF OR DO YOU HAVE SERIOUS DIFFICULTY  HEARING: YES

## 2021-07-23 ASSESSMENT — ACTIVITIES OF DAILY LIVING (ADL)
NEEDS_ASSIST: NO
ADL_SHORT_OF_BREATH: NO
ADL_SCORE: 12
HISTORY OF FALLING IN THE LAST YEAR (PRIOR TO ADMISSION): YES
ADL_BEFORE_ADMISSION: INDEPENDENT

## 2021-07-24 ENCOUNTER — LAB SERVICES (OUTPATIENT)
Dept: LAB | Age: 66
End: 2021-07-24
Attending: ORTHOPAEDIC SURGERY

## 2021-07-24 DIAGNOSIS — Z01.810 PREOP CARDIOVASCULAR EXAM: ICD-10-CM

## 2021-07-24 DIAGNOSIS — Z01.812 ENCOUNTER FOR PREPROCEDURE SCREENING LABORATORY TESTING FOR COVID-19: ICD-10-CM

## 2021-07-24 DIAGNOSIS — Z11.52 ENCOUNTER FOR PREPROCEDURE SCREENING LABORATORY TESTING FOR COVID-19: ICD-10-CM

## 2021-07-24 DIAGNOSIS — Z72.0 TOBACCO USE: ICD-10-CM

## 2021-07-24 PROCEDURE — 93005 ELECTROCARDIOGRAM TRACING: CPT

## 2021-07-24 PROCEDURE — U0005 INFEC AGEN DETEC AMPLI PROBE: HCPCS

## 2021-07-24 PROCEDURE — 93010 ELECTROCARDIOGRAM REPORT: CPT | Performed by: INTERNAL MEDICINE

## 2021-07-25 LAB
SARS-COV-2 RNA RESP QL NAA+PROBE: NOT DETECTED
SERVICE CMNT-IMP: NORMAL
SERVICE CMNT-IMP: NORMAL

## 2021-07-26 LAB
P AXIS (DEGREES): 42
PR-INTERVAL (MSEC): 143
QRS-INTERVAL (MSEC): 109
QT-INTERVAL (MSEC): 425
QTC: 438
R AXIS (DEGREES): 55
REPORT TEXT: NORMAL
T AXIS (DEGREES): 55
VENTRICULAR RATE EKG/MIN (BPM): 66

## 2021-07-27 ENCOUNTER — HOSPITAL ENCOUNTER (OUTPATIENT)
Age: 66
Discharge: HOME | End: 2021-07-27
Attending: ORTHOPAEDIC SURGERY | Admitting: ORTHOPAEDIC SURGERY

## 2021-07-27 ENCOUNTER — ANESTHESIA EVENT (OUTPATIENT)
Dept: SURGERY | Age: 66
End: 2021-07-27

## 2021-07-27 ENCOUNTER — ANESTHESIA (OUTPATIENT)
Dept: SURGERY | Age: 66
End: 2021-07-27

## 2021-07-27 DIAGNOSIS — Z01.810 PREOP CARDIOVASCULAR EXAM: ICD-10-CM

## 2021-07-27 DIAGNOSIS — Z72.0 TOBACCO USE: Primary | ICD-10-CM

## 2021-07-27 DIAGNOSIS — Z01.812 ENCOUNTER FOR PREPROCEDURE SCREENING LABORATORY TESTING FOR COVID-19: ICD-10-CM

## 2021-07-27 DIAGNOSIS — M19.079 ANKLE ARTHRITIS: ICD-10-CM

## 2021-07-27 DIAGNOSIS — Z11.52 ENCOUNTER FOR PREPROCEDURE SCREENING LABORATORY TESTING FOR COVID-19: ICD-10-CM

## 2021-07-27 PROCEDURE — 10002801 HB RX 250 W/O HCPCS: Performed by: NURSE ANESTHETIST, CERTIFIED REGISTERED

## 2021-07-27 PROCEDURE — 10002807 HB RX 258: Performed by: NURSE ANESTHETIST, CERTIFIED REGISTERED

## 2021-07-27 PROCEDURE — 10006023 HB SUPPLY 272: Performed by: ORTHOPAEDIC SURGERY

## 2021-07-27 PROCEDURE — 13000001 HB PHASE II RECOVERY EA 30 MINUTES: Performed by: ORTHOPAEDIC SURGERY

## 2021-07-27 PROCEDURE — 10002807 HB RX 258: Performed by: ANESTHESIOLOGY

## 2021-07-27 PROCEDURE — 13000002 HB ANESTHESIA  GENERAL  S/U + 1ST 15 MIN: Performed by: ORTHOPAEDIC SURGERY

## 2021-07-27 PROCEDURE — 13000037 HB COMPLEX CASE EACH ADD MINUTE: Performed by: ORTHOPAEDIC SURGERY

## 2021-07-27 PROCEDURE — 10002800 HB RX 250 W HCPCS: Performed by: NURSE ANESTHETIST, CERTIFIED REGISTERED

## 2021-07-27 PROCEDURE — 13000003 HB ANESTHESIA  GENERAL EA ADD MINUTE: Performed by: ORTHOPAEDIC SURGERY

## 2021-07-27 PROCEDURE — 10004451 HB PACU RECOVERY 1ST 30 MINUTES: Performed by: ORTHOPAEDIC SURGERY

## 2021-07-27 PROCEDURE — 13000036 HB COMPLEX  CASE S/U + 1ST 15 MIN: Performed by: ORTHOPAEDIC SURGERY

## 2021-07-27 PROCEDURE — 10002801 HB RX 250 W/O HCPCS: Performed by: ORTHOPAEDIC SURGERY

## 2021-07-27 PROCEDURE — 10002803 HB RX 637: Performed by: ORTHOPAEDIC SURGERY

## 2021-07-27 RX ORDER — DEXAMETHASONE SODIUM PHOSPHATE 4 MG/ML
INJECTION, SOLUTION INTRA-ARTICULAR; INTRALESIONAL; INTRAMUSCULAR; INTRAVENOUS; SOFT TISSUE PRN
Status: DISCONTINUED | OUTPATIENT
Start: 2021-07-27 | End: 2021-07-27

## 2021-07-27 RX ORDER — PROCHLORPERAZINE EDISYLATE 5 MG/ML
5 INJECTION INTRAMUSCULAR; INTRAVENOUS EVERY 4 HOURS PRN
Status: DISCONTINUED | OUTPATIENT
Start: 2021-07-27 | End: 2021-07-27 | Stop reason: HOSPADM

## 2021-07-27 RX ORDER — LIDOCAINE HYDROCHLORIDE 10 MG/ML
10 INJECTION, SOLUTION INFILTRATION; PERINEURAL PRN
Status: DISCONTINUED | OUTPATIENT
Start: 2021-07-27 | End: 2021-07-27 | Stop reason: HOSPADM

## 2021-07-27 RX ORDER — SODIUM CHLORIDE, SODIUM LACTATE, POTASSIUM CHLORIDE, CALCIUM CHLORIDE 600; 310; 30; 20 MG/100ML; MG/100ML; MG/100ML; MG/100ML
INJECTION, SOLUTION INTRAVENOUS CONTINUOUS
Status: DISCONTINUED | OUTPATIENT
Start: 2021-07-27 | End: 2021-07-27 | Stop reason: HOSPADM

## 2021-07-27 RX ORDER — BUPIVACAINE HYDROCHLORIDE 2.5 MG/ML
INJECTION, SOLUTION EPIDURAL; INFILTRATION; INTRACAUDAL PRN
Status: DISCONTINUED | OUTPATIENT
Start: 2021-07-27 | End: 2021-07-27 | Stop reason: HOSPADM

## 2021-07-27 RX ORDER — HUMAN INSULIN 100 [IU]/ML
INJECTION, SOLUTION SUBCUTANEOUS
Status: DISCONTINUED | OUTPATIENT
Start: 2021-07-27 | End: 2021-07-27 | Stop reason: HOSPADM

## 2021-07-27 RX ORDER — SODIUM CHLORIDE 9 MG/ML
INJECTION, SOLUTION INTRAVENOUS CONTINUOUS
Status: DISCONTINUED | OUTPATIENT
Start: 2021-07-27 | End: 2021-07-27 | Stop reason: HOSPADM

## 2021-07-27 RX ORDER — DEXTROSE MONOHYDRATE 25 G/50ML
25 INJECTION, SOLUTION INTRAVENOUS PRN
Status: DISCONTINUED | OUTPATIENT
Start: 2021-07-27 | End: 2021-07-27 | Stop reason: HOSPADM

## 2021-07-27 RX ORDER — ONDANSETRON 2 MG/ML
INJECTION INTRAMUSCULAR; INTRAVENOUS PRN
Status: DISCONTINUED | OUTPATIENT
Start: 2021-07-27 | End: 2021-07-27

## 2021-07-27 RX ORDER — ONDANSETRON 2 MG/ML
4 INJECTION INTRAMUSCULAR; INTRAVENOUS 2 TIMES DAILY PRN
Status: DISCONTINUED | OUTPATIENT
Start: 2021-07-27 | End: 2021-07-27 | Stop reason: HOSPADM

## 2021-07-27 RX ORDER — PROPOFOL 10 MG/ML
INJECTION, EMULSION INTRAVENOUS PRN
Status: DISCONTINUED | OUTPATIENT
Start: 2021-07-27 | End: 2021-07-27

## 2021-07-27 RX ORDER — NICOTINE POLACRILEX 4 MG
30 LOZENGE BUCCAL
Status: DISCONTINUED | OUTPATIENT
Start: 2021-07-27 | End: 2021-07-27 | Stop reason: HOSPADM

## 2021-07-27 RX ORDER — LIDOCAINE HYDROCHLORIDE 20 MG/ML
INJECTION, SOLUTION INFILTRATION; PERINEURAL PRN
Status: DISCONTINUED | OUTPATIENT
Start: 2021-07-27 | End: 2021-07-27

## 2021-07-27 RX ORDER — HYDROCODONE BITARTRATE AND ACETAMINOPHEN 5; 325 MG/1; MG/1
1 TABLET ORAL ONCE
Status: COMPLETED | OUTPATIENT
Start: 2021-07-27 | End: 2021-07-27

## 2021-07-27 RX ORDER — DEXTROSE MONOHYDRATE 50 MG/ML
INJECTION, SOLUTION INTRAVENOUS CONTINUOUS PRN
Status: DISCONTINUED | OUTPATIENT
Start: 2021-07-27 | End: 2021-07-27 | Stop reason: HOSPADM

## 2021-07-27 RX ORDER — SODIUM CHLORIDE, SODIUM LACTATE, POTASSIUM CHLORIDE, CALCIUM CHLORIDE 600; 310; 30; 20 MG/100ML; MG/100ML; MG/100ML; MG/100ML
INJECTION, SOLUTION INTRAVENOUS CONTINUOUS PRN
Status: DISCONTINUED | OUTPATIENT
Start: 2021-07-27 | End: 2021-07-27

## 2021-07-27 RX ORDER — 0.9 % SODIUM CHLORIDE 0.9 %
2 VIAL (ML) INJECTION EVERY 12 HOURS SCHEDULED
Status: DISCONTINUED | OUTPATIENT
Start: 2021-07-27 | End: 2021-07-27 | Stop reason: HOSPADM

## 2021-07-27 RX ADMIN — FENTANYL CITRATE 100 MCG: 50 INJECTION, SOLUTION INTRAMUSCULAR; INTRAVENOUS at 07:35

## 2021-07-27 RX ADMIN — SODIUM CHLORIDE, SODIUM LACTATE, POTASSIUM CHLORIDE, AND CALCIUM CHLORIDE: .6; .31; .03; .02 INJECTION, SOLUTION INTRAVENOUS at 06:27

## 2021-07-27 RX ADMIN — SODIUM CHLORIDE, POTASSIUM CHLORIDE, SODIUM LACTATE AND CALCIUM CHLORIDE: 600; 310; 30; 20 INJECTION, SOLUTION INTRAVENOUS at 07:23

## 2021-07-27 RX ADMIN — ONDANSETRON 4 MG: 2 INJECTION INTRAMUSCULAR; INTRAVENOUS at 07:37

## 2021-07-27 RX ADMIN — LIDOCAINE HYDROCHLORIDE 5 ML: 20 INJECTION, SOLUTION INFILTRATION; PERINEURAL at 07:32

## 2021-07-27 RX ADMIN — PROPOFOL 200 MG: 10 INJECTION, EMULSION INTRAVENOUS at 07:32

## 2021-07-27 RX ADMIN — CEFAZOLIN SODIUM 2000 MG: 300 INJECTION, POWDER, LYOPHILIZED, FOR SOLUTION INTRAVENOUS at 07:34

## 2021-07-27 RX ADMIN — HYDROCODONE BITARTRATE AND ACETAMINOPHEN 1 TABLET: 5; 325 TABLET ORAL at 09:01

## 2021-07-27 RX ADMIN — DEXAMETHASONE SODIUM PHOSPHATE 4 MG: 4 INJECTION, SOLUTION INTRAMUSCULAR; INTRAVENOUS at 07:37

## 2021-07-27 ASSESSMENT — PAIN SCALES - GENERAL
PAINLEVEL_OUTOF10: 2
PAINLEVEL_OUTOF10: 5
PAINLEVEL_OUTOF10: 0
PAINLEVEL_OUTOF10: 0

## 2021-07-27 ASSESSMENT — COPD QUESTIONNAIRES: COPD: 1

## 2021-07-28 VITALS
BODY MASS INDEX: 28.25 KG/M2 | OXYGEN SATURATION: 97 % | HEART RATE: 55 BPM | HEIGHT: 70 IN | WEIGHT: 197.31 LBS | DIASTOLIC BLOOD PRESSURE: 71 MMHG | SYSTOLIC BLOOD PRESSURE: 137 MMHG | RESPIRATION RATE: 12 BRPM | TEMPERATURE: 97.3 F

## 2021-10-20 ENCOUNTER — IMAGING SERVICES (OUTPATIENT)
Dept: OTHER | Age: 66
End: 2021-10-20

## 2021-10-25 DIAGNOSIS — R29.898 WEAKNESS OF BOTH LEGS: Primary | ICD-10-CM

## 2021-10-26 ENCOUNTER — HOSPITAL ENCOUNTER (OUTPATIENT)
Dept: REHABILITATION | Age: 66
Discharge: STILL ADMITTED OR ADMINISTRATIVE | End: 2021-10-26
Attending: ORTHOPAEDIC SURGERY

## 2021-10-26 PROCEDURE — 97162 PT EVAL MOD COMPLEX 30 MIN: CPT

## 2021-10-26 PROCEDURE — 97110 THERAPEUTIC EXERCISES: CPT

## 2021-10-26 ASSESSMENT — ENCOUNTER SYMPTOMS
SUBJECTIVE PAIN PROGRESSION: WORSENING
PAIN FREQUENCY: CONSTANT
PAIN SCALE AT LOWEST: 5
PAIN LOCATION: BILATERAL KNEES
PAIN SCALE AT HIGHEST: 10
PAIN SEVERITY NOW: 5
ALLEVIATING FACTORS: OVER-THE-COUNTER MEDICATION

## 2021-10-26 ASSESSMENT — MOVEMENT AND STRENGTH ASSESSMENTS
GETTING INTO OR OUT OF THE BATH: QUITE A BIT OF DIFFICULTY
GOING UP OR DOWN 10 STAIRS (ABOUT 1 FLIGHT OF STAIRS): QUITE A BIT OF DIFFICULTY
PUTTING ON YOUR SHOES OR SOCKS: QUITE A BIT OF DIFFICULTY
ANY OF YOUR USUAL WORK, HOUSEWORK OR SCHOOL ACTIVITIES: QUITE A BIT OF DIFFICULTY
ROLLING OVER IN BED: QUITE A BIT OF DIFFICULTY
STANDING FOR 1 HOUR: EXTREME DIFFICULTY OR UNABLE TO PERFORM ACTIVITY
GETTING INTO OR OUT OF A CAR: QUITE A BIT OF DIFFICULTY
RUNNING ON EVEN GROUND: QUITE A BIT OF DIFFICULTY
LIFTING AN OBJECT, LIKE A BAG OF GROCERIES, FROM THE FLOOR: QUITE A BIT OF DIFFICULTY
TOTAL SCORE: 18.75
WALKING 2 BLOCKS: QUITE A BIT OF DIFFICULTY
HOPPING: EXTREME DIFFICULTY OR UNABLE TO PERFORM ACTIVITY
SITTING FOR 1 HOUR: EXTREME DIFFICULTY OR UNABLE TO PERFORM ACTIVITY
RUNNING ON UNEVEN GROUND: EXTREME DIFFICULTY OR UNABLE TO PERFORM ACTIVITY
PERFORMING LIGHT ACTIVITES AROUND YOUR HOME: QUITE A BIT OF DIFFICULTY
PERFORMING HEAVY ACTIVITIES AROUND YOUR HOME: QUITE A BIT OF DIFFICULTY
WALKING A MILE: QUITE A BIT OF DIFFICULTY
YOUR USUAL HOBBIES, RECREATIONAL OR SPORTING ACTIVIITIES: QUITE A BIT OF DIFFICULTY
WALKING BETWEEN ROOMS: QUITE A BIT OF DIFFICULTY
MAKING SHARP TURNS WHILE RUNNING FAST: EXTREME DIFFICULTY OR UNABLE TO PERFORM ACTIVITY
SQUATTING: QUITE A BIT OF DIFFICULTY

## 2021-10-26 ASSESSMENT — GAIT ASSESSMENTS
TUG TURNS: UNSTABLE ON TURNS
TUG TIME: 60-69 YEARS MALE AND FEMALE 8 SECONDS
TUG TIME: 30
TUG TIME: SINGLE POINT CANE
GAIT WITH AD: SINGLE POINT CANE

## 2021-10-29 ENCOUNTER — HOSPITAL ENCOUNTER (OUTPATIENT)
Dept: REHABILITATION | Age: 66
Discharge: STILL ADMITTED OR ADMINISTRATIVE | End: 2021-10-29
Attending: ORTHOPAEDIC SURGERY

## 2021-10-29 PROCEDURE — 97110 THERAPEUTIC EXERCISES: CPT

## 2021-11-04 ENCOUNTER — HOSPITAL ENCOUNTER (OUTPATIENT)
Dept: REHABILITATION | Age: 66
Discharge: STILL ADMITTED OR ADMINISTRATIVE | End: 2021-11-04
Attending: ORTHOPAEDIC SURGERY

## 2021-11-04 PROCEDURE — 97110 THERAPEUTIC EXERCISES: CPT

## 2021-11-04 ASSESSMENT — ENCOUNTER SYMPTOMS: PAIN SEVERITY NOW: 3

## 2021-11-10 ENCOUNTER — HOSPITAL ENCOUNTER (OUTPATIENT)
Dept: REHABILITATION | Age: 66
Discharge: STILL ADMITTED OR ADMINISTRATIVE | End: 2021-11-10
Attending: ORTHOPAEDIC SURGERY

## 2021-11-10 PROCEDURE — 97110 THERAPEUTIC EXERCISES: CPT

## 2021-11-10 ASSESSMENT — ENCOUNTER SYMPTOMS
PAIN FREQUENCY: CONSTANT
PAIN SEVERITY NOW: 2

## 2021-11-17 ENCOUNTER — HOSPITAL ENCOUNTER (OUTPATIENT)
Dept: REHABILITATION | Age: 66
Discharge: STILL ADMITTED OR ADMINISTRATIVE | End: 2021-11-17
Attending: ORTHOPAEDIC SURGERY

## 2021-11-17 PROCEDURE — 97110 THERAPEUTIC EXERCISES: CPT

## 2021-11-17 ASSESSMENT — ENCOUNTER SYMPTOMS
PAIN FREQUENCY: CONSTANT
PAIN SEVERITY NOW: 2

## 2021-12-01 ENCOUNTER — HOSPITAL ENCOUNTER (OUTPATIENT)
Dept: REHABILITATION | Age: 66
Discharge: STILL ADMITTED OR ADMINISTRATIVE | End: 2021-12-01
Attending: ORTHOPAEDIC SURGERY

## 2021-12-01 PROCEDURE — 97110 THERAPEUTIC EXERCISES: CPT

## 2021-12-01 ASSESSMENT — MOVEMENT AND STRENGTH ASSESSMENTS
RUNNING ON UNEVEN GROUND: EXTREME DIFFICULTY OR UNABLE TO PERFORM ACTIVITY
PERFORMING LIGHT ACTIVITES AROUND YOUR HOME: QUITE A BIT OF DIFFICULTY
MAKING SHARP TURNS WHILE RUNNING FAST: QUITE A BIT OF DIFFICULTY
ROLLING OVER IN BED: MODERATE DIFFICULTY
PERFORMING HEAVY ACTIVITIES AROUND YOUR HOME: QUITE A BIT OF DIFFICULTY
LIFTING AN OBJECT, LIKE A BAG OF GROCERIES, FROM THE FLOOR: QUITE A BIT OF DIFFICULTY
HOPPING: QUITE A BIT OF DIFFICULTY
WALKING BETWEEN ROOMS: QUITE A BIT OF DIFFICULTY
RUNNING ON EVEN GROUND: EXTREME DIFFICULTY OR UNABLE TO PERFORM ACTIVITY
TOTAL SCORE: 20
SITTING FOR 1 HOUR: EXTREME DIFFICULTY OR UNABLE TO PERFORM ACTIVITY
PUTTING ON YOUR SHOES OR SOCKS: QUITE A BIT OF DIFFICULTY
ANY OF YOUR USUAL WORK, HOUSEWORK OR SCHOOL ACTIVITIES: QUITE A BIT OF DIFFICULTY
STANDING FOR 1 HOUR: EXTREME DIFFICULTY OR UNABLE TO PERFORM ACTIVITY
YOUR USUAL HOBBIES, RECREATIONAL OR SPORTING ACTIVIITIES: QUITE A BIT OF DIFFICULTY
WALKING A MILE: QUITE A BIT OF DIFFICULTY
GETTING INTO OR OUT OF A CAR: QUITE A BIT OF DIFFICULTY
WALKING 2 BLOCKS: EXTREME DIFFICULTY OR UNABLE TO PERFORM ACTIVITY
GETTING INTO OR OUT OF THE BATH: QUITE A BIT OF DIFFICULTY
SQUATTING: QUITE A BIT OF DIFFICULTY
GOING UP OR DOWN 10 STAIRS (ABOUT 1 FLIGHT OF STAIRS): QUITE A BIT OF DIFFICULTY

## 2021-12-02 ENCOUNTER — HOSPITAL ENCOUNTER (OUTPATIENT)
Dept: LAB | Age: 66
Discharge: HOME | End: 2021-12-02
Attending: PHYSICIAN ASSISTANT

## 2021-12-02 LAB
ALBUMIN SERPL-MCNC: 3.7 G/DL (ref 3.6–5.1)
ALBUMIN/GLOB SERPL: 1.1 {RATIO} (ref 1–2.4)
ALP SERPL-CCNC: 115 UNITS/L (ref 45–117)
ALT SERPL-CCNC: 25 UNITS/L
ANION GAP SERPL CALC-SCNC: 11 MMOL/L (ref 10–20)
AST SERPL-CCNC: 15 UNITS/L
BASOPHILS # BLD: 0.1 K/MCL (ref 0–0.3)
BASOPHILS NFR BLD: 1 %
BILIRUB SERPL-MCNC: 0.4 MG/DL (ref 0.2–1)
BUN SERPL-MCNC: 17 MG/DL (ref 6–20)
BUN/CREAT SERPL: 17 (ref 7–25)
CALCIUM SERPL-MCNC: 8.9 MG/DL (ref 8.4–10.2)
CHLORIDE SERPL-SCNC: 102 MMOL/L (ref 98–107)
CHOLEST SERPL-MCNC: 174 MG/DL
CHOLEST/HDLC SERPL: 2.4 {RATIO}
CO2 SERPL-SCNC: 31 MMOL/L (ref 21–32)
CREAT SERPL-MCNC: 1.02 MG/DL (ref 0.67–1.17)
DEPRECATED RDW RBC: 43.6 FL (ref 39–50)
EOSINOPHIL # BLD: 0.3 K/MCL (ref 0–0.5)
EOSINOPHIL NFR BLD: 3 %
ERYTHROCYTE [DISTWIDTH] IN BLOOD: 12.8 % (ref 11–15)
FASTING DURATION TIME PATIENT: ABNORMAL H
FASTING DURATION TIME PATIENT: NORMAL H
GFR SERPLBLD BASED ON 1.73 SQ M-ARVRAT: 76 ML/MIN
GLOBULIN SER-MCNC: 3.5 G/DL (ref 2–4)
GLUCOSE SERPL-MCNC: 95 MG/DL (ref 70–99)
HCT VFR BLD CALC: 48 % (ref 39–51)
HDLC SERPL-MCNC: 72 MG/DL
HGB BLD-MCNC: 16.1 G/DL (ref 13–17)
IMM GRANULOCYTES # BLD AUTO: 0.1 K/MCL (ref 0–0.2)
IMM GRANULOCYTES # BLD: 1 %
LDLC SERPL CALC-MCNC: 89 MG/DL
LYMPHOCYTES # BLD: 1.8 K/MCL (ref 1–4)
LYMPHOCYTES NFR BLD: 19 %
MCH RBC QN AUTO: 31 PG (ref 26–34)
MCHC RBC AUTO-ENTMCNC: 33.5 G/DL (ref 32–36.5)
MCV RBC AUTO: 92.5 FL (ref 78–100)
MONOCYTES # BLD: 0.8 K/MCL (ref 0.3–0.9)
MONOCYTES NFR BLD: 8 %
NEUTROPHILS # BLD: 6.4 K/MCL (ref 1.8–7.7)
NEUTROPHILS NFR BLD: 68 %
NONHDLC SERPL-MCNC: 102 MG/DL
NRBC BLD MANUAL-RTO: 0 /100 WBC
PLATELET # BLD AUTO: 239 K/MCL (ref 140–450)
POTASSIUM SERPL-SCNC: 5.2 MMOL/L (ref 3.4–5.1)
PROT SERPL-MCNC: 7.2 G/DL (ref 6.4–8.2)
PSA SERPL-MCNC: 1.73 NG/ML
RBC # BLD: 5.19 MIL/MCL (ref 4.5–5.9)
SODIUM SERPL-SCNC: 139 MMOL/L (ref 135–145)
TRIGL SERPL-MCNC: 65 MG/DL
TSH SERPL-ACNC: 0.94 MCUNITS/ML (ref 0.35–5)
WBC # BLD: 9.4 K/MCL (ref 4.2–11)

## 2021-12-02 PROCEDURE — 84443 ASSAY THYROID STIM HORMONE: CPT

## 2021-12-02 PROCEDURE — 84153 ASSAY OF PSA TOTAL: CPT

## 2021-12-02 PROCEDURE — 36415 COLL VENOUS BLD VENIPUNCTURE: CPT

## 2021-12-02 PROCEDURE — 80053 COMPREHEN METABOLIC PANEL: CPT

## 2021-12-02 PROCEDURE — 80061 LIPID PANEL: CPT

## 2021-12-02 PROCEDURE — 85025 COMPLETE CBC W/AUTO DIFF WBC: CPT

## 2021-12-08 ENCOUNTER — APPOINTMENT (OUTPATIENT)
Dept: REHABILITATION | Age: 66
End: 2021-12-08
Attending: ORTHOPAEDIC SURGERY

## 2021-12-22 ENCOUNTER — IMAGING SERVICES (OUTPATIENT)
Dept: OTHER | Age: 66
End: 2021-12-22

## 2022-01-10 ENCOUNTER — HOSPITAL ENCOUNTER (OUTPATIENT)
Dept: GENERAL RADIOLOGY | Age: 67
Discharge: HOME | End: 2022-01-10
Attending: PHYSICIAN ASSISTANT

## 2022-01-10 ENCOUNTER — LAB SERVICES (OUTPATIENT)
Dept: LAB | Age: 67
End: 2022-01-10
Attending: PHYSICIAN ASSISTANT

## 2022-01-10 DIAGNOSIS — Z01.818 OTHER SPECIFIED PRE-OPERATIVE EXAMINATION: Primary | ICD-10-CM

## 2022-01-10 DIAGNOSIS — Z01.818 OTHER SPECIFIED PRE-OPERATIVE EXAMINATION: ICD-10-CM

## 2022-01-10 LAB
ANION GAP SERPL CALC-SCNC: 12 MMOL/L (ref 10–20)
BUN SERPL-MCNC: 15 MG/DL (ref 6–20)
BUN/CREAT SERPL: 14 (ref 7–25)
CALCIUM SERPL-MCNC: 9 MG/DL (ref 8.4–10.2)
CHLORIDE SERPL-SCNC: 104 MMOL/L (ref 98–107)
CO2 SERPL-SCNC: 30 MMOL/L (ref 21–32)
CREAT SERPL-MCNC: 1.04 MG/DL (ref 0.67–1.17)
FASTING DURATION TIME PATIENT: NORMAL H
GFR SERPLBLD BASED ON 1.73 SQ M-ARVRAT: 74 ML/MIN
GLUCOSE SERPL-MCNC: 81 MG/DL (ref 70–99)
POTASSIUM SERPL-SCNC: 4.7 MMOL/L (ref 3.4–5.1)
SODIUM SERPL-SCNC: 141 MMOL/L (ref 135–145)

## 2022-01-10 PROCEDURE — 80048 BASIC METABOLIC PNL TOTAL CA: CPT

## 2022-01-10 PROCEDURE — 71046 X-RAY EXAM CHEST 2 VIEWS: CPT

## 2022-01-19 RX ORDER — IBUPROFEN 200 MG
200 TABLET ORAL EVERY 12 HOURS PRN
COMMUNITY

## 2022-01-19 RX ORDER — NAPROXEN SODIUM 220 MG
220 TABLET ORAL 2 TIMES DAILY WITH MEALS
COMMUNITY

## 2022-01-19 ASSESSMENT — ACTIVITIES OF DAILY LIVING (ADL)
NEEDS_ASSIST: NO
ADL_SHORT_OF_BREATH: YES
HISTORY OF FALLING IN THE LAST YEAR (PRIOR TO ADMISSION): YES

## 2022-01-19 ASSESSMENT — COGNITIVE AND FUNCTIONAL STATUS - GENERAL
ARE YOU BLIND OR DO YOU HAVE SERIOUS DIFFICULTY SEEING, EVEN WHEN WEARING GLASSES: NO
ARE YOU DEAF OR DO YOU HAVE SERIOUS DIFFICULTY  HEARING: NO

## 2022-01-22 ENCOUNTER — LAB SERVICES (OUTPATIENT)
Dept: LAB | Age: 67
End: 2022-01-22
Attending: ANESTHESIOLOGY

## 2022-01-22 DIAGNOSIS — Z01.812 PRE-PROCEDURAL LABORATORY EXAMINATION: ICD-10-CM

## 2022-01-22 PROCEDURE — U0003 INFECTIOUS AGENT DETECTION BY NUCLEIC ACID (DNA OR RNA); SEVERE ACUTE RESPIRATORY SYNDROME CORONAVIRUS 2 (SARS-COV-2) (CORONAVIRUS DISEASE [COVID-19]), AMPLIFIED PROBE TECHNIQUE, MAKING USE OF HIGH THROUGHPUT TECHNOLOGIES AS DESCRIBED BY CMS-2020-01-R: HCPCS

## 2022-01-23 LAB
SARS-COV-2 RNA RESP QL NAA+PROBE: NOT DETECTED
SERVICE CMNT-IMP: NORMAL
SERVICE CMNT-IMP: NORMAL

## 2022-01-24 ENCOUNTER — ANESTHESIA EVENT (OUTPATIENT)
Dept: SURGERY | Age: 67
End: 2022-01-24

## 2022-01-24 SDOH — SOCIAL STABILITY: SOCIAL INSECURITY: RISK FACTORS: KIDNEY DISEASE

## 2022-01-24 SDOH — SOCIAL STABILITY: SOCIAL INSECURITY: RISK FACTORS: HEMATOLOGICAL DISEASE

## 2022-01-24 SDOH — SOCIAL STABILITY: SOCIAL INSECURITY: RISK FACTORS: PULMONARY DISEASE

## 2022-01-24 SDOH — SOCIAL STABILITY: SOCIAL INSECURITY: RISK FACTORS: HEART DISEASE

## 2022-01-24 ASSESSMENT — LIFESTYLE VARIABLES: SMOKING_STATUS: CURRENT

## 2022-01-24 ASSESSMENT — COPD QUESTIONNAIRES
COPD: 1
CAT_SEVERITY: MODERATE

## 2022-01-25 ENCOUNTER — ANESTHESIA (OUTPATIENT)
Dept: SURGERY | Age: 67
End: 2022-01-25

## 2022-01-25 ENCOUNTER — HOSPITAL ENCOUNTER (OUTPATIENT)
Age: 67
Discharge: HOME | End: 2022-01-25
Attending: ORTHOPAEDIC SURGERY | Admitting: ORTHOPAEDIC SURGERY

## 2022-01-25 VITALS
SYSTOLIC BLOOD PRESSURE: 153 MMHG | TEMPERATURE: 97 F | HEIGHT: 70 IN | WEIGHT: 202.38 LBS | OXYGEN SATURATION: 100 % | BODY MASS INDEX: 28.97 KG/M2 | HEART RATE: 60 BPM | DIASTOLIC BLOOD PRESSURE: 71 MMHG | RESPIRATION RATE: 18 BRPM

## 2022-01-25 DIAGNOSIS — Z01.812 PRE-PROCEDURAL LABORATORY EXAMINATION: ICD-10-CM

## 2022-01-25 DIAGNOSIS — Z01.818 PREOP EXAMINATION: Primary | ICD-10-CM

## 2022-01-25 PROCEDURE — 10002800 HB RX 250 W HCPCS: Performed by: ANESTHESIOLOGY

## 2022-01-25 PROCEDURE — 13000114 HB ORTHO BASIC CASE S/U + 1ST 15 MIN: Performed by: ORTHOPAEDIC SURGERY

## 2022-01-25 PROCEDURE — 10004451 HB PACU RECOVERY 1ST 30 MINUTES: Performed by: ORTHOPAEDIC SURGERY

## 2022-01-25 PROCEDURE — 10006023 HB SUPPLY 272: Performed by: ORTHOPAEDIC SURGERY

## 2022-01-25 PROCEDURE — C1769 GUIDE WIRE: HCPCS | Performed by: ORTHOPAEDIC SURGERY

## 2022-01-25 PROCEDURE — 10002807 HB RX 258: Performed by: ANESTHESIOLOGY

## 2022-01-25 PROCEDURE — 10004452 HB PACU ADDL 30 MINUTES: Performed by: ORTHOPAEDIC SURGERY

## 2022-01-25 PROCEDURE — 13000001 HB PHASE II RECOVERY EA 30 MINUTES: Performed by: ORTHOPAEDIC SURGERY

## 2022-01-25 PROCEDURE — 13000115 HB ORTHO BASIC CASE EA ADD MINUTE: Performed by: ORTHOPAEDIC SURGERY

## 2022-01-25 PROCEDURE — 10002801 HB RX 250 W/O HCPCS: Performed by: ORTHOPAEDIC SURGERY

## 2022-01-25 PROCEDURE — C1713 ANCHOR/SCREW BN/BN,TIS/BN: HCPCS | Performed by: ORTHOPAEDIC SURGERY

## 2022-01-25 PROCEDURE — 10006027 HB SUPPLY 278: Performed by: ORTHOPAEDIC SURGERY

## 2022-01-25 PROCEDURE — 10002800 HB RX 250 W HCPCS: Performed by: ORTHOPAEDIC SURGERY

## 2022-01-25 PROCEDURE — 13000002 HB ANESTHESIA  GENERAL  S/U + 1ST 15 MIN: Performed by: ORTHOPAEDIC SURGERY

## 2022-01-25 PROCEDURE — 13000003 HB ANESTHESIA  GENERAL EA ADD MINUTE: Performed by: ORTHOPAEDIC SURGERY

## 2022-01-25 DEVICE — IMPLANTABLE DEVICE: Type: IMPLANTABLE DEVICE | Site: ANKLE | Status: FUNCTIONAL

## 2022-01-25 RX ORDER — PROCHLORPERAZINE MALEATE 5 MG/1
5 TABLET ORAL EVERY 4 HOURS PRN
Status: DISCONTINUED | OUTPATIENT
Start: 2022-01-25 | End: 2022-01-25 | Stop reason: HOSPADM

## 2022-01-25 RX ORDER — HUMAN INSULIN 100 [IU]/ML
INJECTION, SOLUTION SUBCUTANEOUS
Status: DISCONTINUED | OUTPATIENT
Start: 2022-01-25 | End: 2022-01-25 | Stop reason: HOSPADM

## 2022-01-25 RX ORDER — DEXTROSE MONOHYDRATE 50 MG/ML
INJECTION, SOLUTION INTRAVENOUS CONTINUOUS PRN
Status: CANCELLED | OUTPATIENT
Start: 2022-01-25

## 2022-01-25 RX ORDER — DEXTROSE MONOHYDRATE 25 G/50ML
25 INJECTION, SOLUTION INTRAVENOUS PRN
Status: CANCELLED | OUTPATIENT
Start: 2022-01-25

## 2022-01-25 RX ORDER — DEXTROSE MONOHYDRATE 25 G/50ML
25 INJECTION, SOLUTION INTRAVENOUS PRN
Status: DISCONTINUED | OUTPATIENT
Start: 2022-01-25 | End: 2022-01-25 | Stop reason: HOSPADM

## 2022-01-25 RX ORDER — HUMAN INSULIN 100 [IU]/ML
INJECTION, SOLUTION SUBCUTANEOUS
Status: CANCELLED | OUTPATIENT
Start: 2022-01-25

## 2022-01-25 RX ORDER — ONDANSETRON 2 MG/ML
INJECTION INTRAMUSCULAR; INTRAVENOUS PRN
Status: DISCONTINUED | OUTPATIENT
Start: 2022-01-25 | End: 2022-01-25

## 2022-01-25 RX ORDER — SODIUM CHLORIDE 9 MG/ML
INJECTION, SOLUTION INTRAVENOUS CONTINUOUS
Status: CANCELLED | OUTPATIENT
Start: 2022-01-25

## 2022-01-25 RX ORDER — DEXTROSE MONOHYDRATE 50 MG/ML
INJECTION, SOLUTION INTRAVENOUS CONTINUOUS PRN
Status: DISCONTINUED | OUTPATIENT
Start: 2022-01-25 | End: 2022-01-25 | Stop reason: HOSPADM

## 2022-01-25 RX ORDER — LIDOCAINE HYDROCHLORIDE 10 MG/ML
5-10 INJECTION, SOLUTION INFILTRATION; PERINEURAL PRN
Status: DISCONTINUED | OUTPATIENT
Start: 2022-01-25 | End: 2022-01-25 | Stop reason: HOSPADM

## 2022-01-25 RX ORDER — NICOTINE POLACRILEX 4 MG
30 LOZENGE BUCCAL
Status: CANCELLED | OUTPATIENT
Start: 2022-01-25

## 2022-01-25 RX ORDER — MAGNESIUM HYDROXIDE 1200 MG/15ML
LIQUID ORAL PRN
Status: DISCONTINUED | OUTPATIENT
Start: 2022-01-25 | End: 2022-01-25 | Stop reason: HOSPADM

## 2022-01-25 RX ORDER — PROPOFOL 10 MG/ML
INJECTION, EMULSION INTRAVENOUS PRN
Status: DISCONTINUED | OUTPATIENT
Start: 2022-01-25 | End: 2022-01-25

## 2022-01-25 RX ORDER — SODIUM CHLORIDE 9 MG/ML
INJECTION, SOLUTION INTRAVENOUS CONTINUOUS
Status: DISCONTINUED | OUTPATIENT
Start: 2022-01-25 | End: 2022-01-25 | Stop reason: HOSPADM

## 2022-01-25 RX ORDER — 0.9 % SODIUM CHLORIDE 0.9 %
2 VIAL (ML) INJECTION EVERY 12 HOURS SCHEDULED
Status: DISCONTINUED | OUTPATIENT
Start: 2022-01-25 | End: 2022-01-25 | Stop reason: HOSPADM

## 2022-01-25 RX ORDER — SODIUM CHLORIDE, SODIUM LACTATE, POTASSIUM CHLORIDE, CALCIUM CHLORIDE 600; 310; 30; 20 MG/100ML; MG/100ML; MG/100ML; MG/100ML
INJECTION, SOLUTION INTRAVENOUS CONTINUOUS
Status: CANCELLED | OUTPATIENT
Start: 2022-01-25

## 2022-01-25 RX ORDER — NICOTINE POLACRILEX 4 MG
30 LOZENGE BUCCAL
Status: DISCONTINUED | OUTPATIENT
Start: 2022-01-25 | End: 2022-01-25 | Stop reason: HOSPADM

## 2022-01-25 RX ORDER — DIAZEPAM 10 MG/1
10 TABLET ORAL EVERY 6 HOURS PRN
Qty: 20 TABLET | Refills: 0 | Status: SHIPPED | OUTPATIENT
Start: 2022-01-25

## 2022-01-25 RX ORDER — SODIUM CHLORIDE, SODIUM LACTATE, POTASSIUM CHLORIDE, CALCIUM CHLORIDE 600; 310; 30; 20 MG/100ML; MG/100ML; MG/100ML; MG/100ML
INJECTION, SOLUTION INTRAVENOUS CONTINUOUS
Status: DISCONTINUED | OUTPATIENT
Start: 2022-01-25 | End: 2022-01-25 | Stop reason: HOSPADM

## 2022-01-25 RX ORDER — LIDOCAINE HYDROCHLORIDE 10 MG/ML
5-10 INJECTION, SOLUTION INFILTRATION; PERINEURAL PRN
Status: CANCELLED | OUTPATIENT
Start: 2022-01-25

## 2022-01-25 RX ORDER — SODIUM CHLORIDE, SODIUM LACTATE, POTASSIUM CHLORIDE, CALCIUM CHLORIDE 600; 310; 30; 20 MG/100ML; MG/100ML; MG/100ML; MG/100ML
INJECTION, SOLUTION INTRAVENOUS CONTINUOUS PRN
Status: DISCONTINUED | OUTPATIENT
Start: 2022-01-25 | End: 2022-01-25

## 2022-01-25 RX ORDER — BUPIVACAINE HYDROCHLORIDE 5 MG/ML
INJECTION, SOLUTION EPIDURAL; INTRACAUDAL PRN
Status: DISCONTINUED | OUTPATIENT
Start: 2022-01-25 | End: 2022-01-25 | Stop reason: HOSPADM

## 2022-01-25 RX ADMIN — CEFAZOLIN SODIUM 2000 MG: 300 INJECTION, POWDER, LYOPHILIZED, FOR SOLUTION INTRAVENOUS at 09:21

## 2022-01-25 RX ADMIN — SODIUM CHLORIDE, POTASSIUM CHLORIDE, SODIUM LACTATE AND CALCIUM CHLORIDE: 600; 310; 30; 20 INJECTION, SOLUTION INTRAVENOUS at 08:28

## 2022-01-25 RX ADMIN — ONDANSETRON 4 MG: 2 INJECTION INTRAMUSCULAR; INTRAVENOUS at 10:08

## 2022-01-25 RX ADMIN — KETOROLAC TROMETHAMINE 15 MG: 30 INJECTION, SOLUTION INTRAMUSCULAR at 10:08

## 2022-01-25 RX ADMIN — PROPOFOL 200 MG: 10 INJECTION, EMULSION INTRAVENOUS at 09:20

## 2022-01-25 RX ADMIN — HYDROMORPHONE HYDROCHLORIDE 0.4 MG: 1 INJECTION, SOLUTION INTRAMUSCULAR; INTRAVENOUS; SUBCUTANEOUS at 10:26

## 2022-01-25 RX ADMIN — SODIUM CHLORIDE, POTASSIUM CHLORIDE, SODIUM LACTATE AND CALCIUM CHLORIDE: 600; 310; 30; 20 INJECTION, SOLUTION INTRAVENOUS at 09:17

## 2022-01-25 ASSESSMENT — PAIN SCALES - GENERAL
PAINLEVEL_OUTOF10: 4
PAINLEVEL_OUTOF10: 2
PAINLEVEL_OUTOF10: 5
PAINLEVEL_OUTOF10: 3
PAINLEVEL_OUTOF10: 2

## 2022-02-10 ENCOUNTER — HOSPITAL ENCOUNTER (OUTPATIENT)
Dept: MRI IMAGING | Age: 67
Discharge: HOME | End: 2022-02-10
Attending: PHYSICIAN ASSISTANT

## 2022-02-10 DIAGNOSIS — M54.16 LUMBAR RADICULOPATHY: ICD-10-CM

## 2022-02-10 PROCEDURE — 72148 MRI LUMBAR SPINE W/O DYE: CPT

## 2022-02-28 ENCOUNTER — LAB SERVICES (OUTPATIENT)
Dept: LAB | Age: 67
End: 2022-02-28
Attending: INTERNAL MEDICINE

## 2022-02-28 DIAGNOSIS — B18.2 CHRONIC HEPATITIS C WITH HEPATIC COMA (CMD): Primary | ICD-10-CM

## 2022-02-28 PROCEDURE — 86803 HEPATITIS C AB TEST: CPT

## 2022-02-28 PROCEDURE — 87522 HEPATITIS C REVRS TRNSCRPJ: CPT

## 2022-03-01 LAB
HCV AB SER QL: POSITIVE
HCV RNA SERPL NAA+PROBE-ACNC: NOT DETECTED K[IU]/ML
HCV RNA SERPL NAA+PROBE-LOG IU: NOT DETECTED {LOG_IU}/ML
SERVICE CMNT-IMP: NORMAL

## 2022-03-15 DIAGNOSIS — M96.0 PSEUDARTHROSIS AFTER FUSION OR ARTHRODESIS: Primary | ICD-10-CM

## 2022-03-21 ENCOUNTER — HOSPITAL ENCOUNTER (OUTPATIENT)
Dept: REHABILITATION | Age: 67
Discharge: STILL ADMITTED OR ADMINISTRATIVE | End: 2022-03-21
Attending: ORTHOPAEDIC SURGERY

## 2022-03-21 PROCEDURE — 97161 PT EVAL LOW COMPLEX 20 MIN: CPT | Performed by: PHYSICAL THERAPIST

## 2022-03-21 PROCEDURE — 97530 THERAPEUTIC ACTIVITIES: CPT | Performed by: PHYSICAL THERAPIST

## 2022-03-21 ASSESSMENT — ENCOUNTER SYMPTOMS
PAIN SEVERITY NOW: 3
ALLEVIATING FACTORS: REST
QUALITY: TINGLING
PAIN SCALE AT LOWEST: 3
QUALITY: SHARP
QUALITY: SORE
PAIN SCALE AT HIGHEST: 8
QUALITY: ACHE

## 2022-03-21 ASSESSMENT — MOVEMENT AND STRENGTH ASSESSMENTS
RUNNING ON UNEVEN GROUND: EXTREME DIFFICULTY OR UNABLE TO PERFORM ACTIVITY
STANDING FOR 1 HOUR: QUITE A BIT OF DIFFICULTY
HOPPING: EXTREME DIFFICULTY OR UNABLE TO PERFORM ACTIVITY
PUTTING ON YOUR SHOES OR SOCKS: QUITE A BIT OF DIFFICULTY
ROLLING OVER IN BED: QUITE A BIT OF DIFFICULTY
YOUR USUAL HOBBIES, RECREATIONAL OR SPORTING ACTIVIITIES: QUITE A BIT OF DIFFICULTY
ANY OF YOUR USUAL WORK, HOUSEWORK OR SCHOOL ACTIVITIES: QUITE A BIT OF DIFFICULTY
RUNNING ON EVEN GROUND: EXTREME DIFFICULTY OR UNABLE TO PERFORM ACTIVITY
MAKING SHARP TURNS WHILE RUNNING FAST: EXTREME DIFFICULTY OR UNABLE TO PERFORM ACTIVITY
LIFTING AN OBJECT, LIKE A BAG OF GROCERIES, FROM THE FLOOR: QUITE A BIT OF DIFFICULTY
GETTING INTO OR OUT OF THE BATH: QUITE A BIT OF DIFFICULTY
SITTING FOR 1 HOUR: QUITE A BIT OF DIFFICULTY
TOTAL SCORE: 18.75
PERFORMING LIGHT ACTIVITES AROUND YOUR HOME: QUITE A BIT OF DIFFICULTY
WALKING 2 BLOCKS: QUITE A BIT OF DIFFICULTY
WALKING A MILE: EXTREME DIFFICULTY OR UNABLE TO PERFORM ACTIVITY
GETTING INTO OR OUT OF A CAR: QUITE A BIT OF DIFFICULTY
SQUATTING: QUITE A BIT OF DIFFICULTY
WALKING BETWEEN ROOMS: QUITE A BIT OF DIFFICULTY
GOING UP OR DOWN 10 STAIRS (ABOUT 1 FLIGHT OF STAIRS): QUITE A BIT OF DIFFICULTY
PERFORMING HEAVY ACTIVITIES AROUND YOUR HOME: QUITE A BIT OF DIFFICULTY

## 2022-03-21 ASSESSMENT — GAIT ASSESSMENTS
TUG TIME: SINGLE POINT CANE
TUG TIME: 15
TUG TURNS: STABLE ON TURNS

## 2024-05-23 ENCOUNTER — TELEPHONE (OUTPATIENT)
Dept: OTHER | Age: 69
End: 2024-05-23

## 2024-12-27 ENCOUNTER — APPOINTMENT (OUTPATIENT)
Dept: CT IMAGING | Age: 69
DRG: 435 | End: 2024-12-27
Attending: EMERGENCY MEDICINE

## 2024-12-27 ENCOUNTER — APPOINTMENT (OUTPATIENT)
Dept: GENERAL RADIOLOGY | Age: 69
DRG: 435 | End: 2024-12-27
Attending: INTERNAL MEDICINE

## 2024-12-27 ENCOUNTER — APPOINTMENT (OUTPATIENT)
Dept: MRI IMAGING | Age: 69
DRG: 435 | End: 2024-12-27
Attending: INTERNAL MEDICINE

## 2024-12-27 ENCOUNTER — APPOINTMENT (OUTPATIENT)
Dept: MRI IMAGING | Age: 69
DRG: 435 | End: 2024-12-27
Attending: STUDENT IN AN ORGANIZED HEALTH CARE EDUCATION/TRAINING PROGRAM

## 2024-12-27 ENCOUNTER — HOSPITAL ENCOUNTER (INPATIENT)
Age: 69
DRG: 435 | End: 2024-12-27
Attending: EMERGENCY MEDICINE | Admitting: INTERNAL MEDICINE

## 2024-12-27 DIAGNOSIS — R19.7 DIARRHEA, UNSPECIFIED TYPE: ICD-10-CM

## 2024-12-27 DIAGNOSIS — E87.6 HYPOKALEMIA: Primary | ICD-10-CM

## 2024-12-27 DIAGNOSIS — K75.0 LIVER ABSCESS (CMD): ICD-10-CM

## 2024-12-27 PROBLEM — Z86.19 HISTORY OF HEPATITIS C: Status: ACTIVE | Noted: 2024-12-27

## 2024-12-27 PROBLEM — E86.0 DEHYDRATION: Status: ACTIVE | Noted: 2024-12-27

## 2024-12-27 PROBLEM — K76.9 LIVER LESION: Status: ACTIVE | Noted: 2024-12-27

## 2024-12-27 PROBLEM — R63.4 WEIGHT LOSS: Status: ACTIVE | Noted: 2024-12-27

## 2024-12-27 LAB
ALBUMIN SERPL-MCNC: 2.8 G/DL (ref 3.4–5)
ALBUMIN/GLOB SERPL: 0.6 {RATIO} (ref 1–2.4)
ALP SERPL-CCNC: 74 UNITS/L (ref 45–117)
ALT SERPL-CCNC: 14 UNITS/L
ANION GAP SERPL CALC-SCNC: 7 MMOL/L (ref 7–19)
APPEARANCE UR: CLEAR
AST SERPL-CCNC: 23 UNITS/L
BACTERIA #/AREA URNS HPF: ABNORMAL /HPF
BASOPHILS # BLD: 0.1 K/MCL (ref 0–0.3)
BASOPHILS NFR BLD: 0 %
BILIRUB SERPL-MCNC: 0.5 MG/DL (ref 0.2–1)
BILIRUB UR QL STRIP: NEGATIVE
BUN SERPL-MCNC: 7 MG/DL (ref 6–20)
BUN/CREAT SERPL: 8 (ref 7–25)
CALCIUM SERPL-MCNC: 8.6 MG/DL (ref 8.4–10.2)
CHLORIDE SERPL-SCNC: 98 MMOL/L (ref 97–110)
CO2 SERPL-SCNC: 35 MMOL/L (ref 21–32)
COLOR UR: YELLOW
CREAT SERPL-MCNC: 0.88 MG/DL (ref 0.67–1.17)
DEPRECATED RDW RBC: 44.8 FL (ref 39–50)
EGFRCR SERPLBLD CKD-EPI 2021: >90 ML/MIN/{1.73_M2}
EOSINOPHIL # BLD: 0.2 K/MCL (ref 0–0.5)
EOSINOPHIL NFR BLD: 2 %
ERYTHROCYTE [DISTWIDTH] IN BLOOD: 13.2 % (ref 11–15)
ETHANOL SERPL-MCNC: NORMAL MG/DL
FASTING DURATION TIME PATIENT: ABNORMAL H
FLUAV RNA RESP QL NAA+PROBE: NOT DETECTED
FLUBV RNA RESP QL NAA+PROBE: NOT DETECTED
GLOBULIN SER-MCNC: 4.9 G/DL (ref 2–4)
GLUCOSE SERPL-MCNC: 108 MG/DL (ref 70–99)
GLUCOSE UR STRIP-MCNC: NEGATIVE MG/DL
HCT VFR BLD CALC: 40.8 % (ref 39–51)
HGB BLD-MCNC: 14 G/DL (ref 13–17)
HGB UR QL STRIP: ABNORMAL
HYALINE CASTS #/AREA URNS LPF: ABNORMAL /LPF
IMM GRANULOCYTES # BLD AUTO: 0 K/MCL (ref 0–0.2)
IMM GRANULOCYTES # BLD: 0 %
KETONES UR STRIP-MCNC: NEGATIVE MG/DL
LEUKOCYTE ESTERASE UR QL STRIP: NEGATIVE
LIPASE SERPL-CCNC: 39 UNITS/L (ref 15–77)
LYMPHOCYTES # BLD: 1.7 K/MCL (ref 1–4)
LYMPHOCYTES NFR BLD: 14 %
MAGNESIUM SERPL-MCNC: 2.1 MG/DL (ref 1.7–2.4)
MCH RBC QN AUTO: 31.6 PG (ref 26–34)
MCHC RBC AUTO-ENTMCNC: 34.3 G/DL (ref 32–36.5)
MCV RBC AUTO: 92.1 FL (ref 78–100)
MONOCYTES # BLD: 0.8 K/MCL (ref 0.3–0.9)
MONOCYTES NFR BLD: 7 %
NEUTROPHILS # BLD: 9.1 K/MCL (ref 1.8–7.7)
NEUTROPHILS NFR BLD: 77 %
NITRITE UR QL STRIP: NEGATIVE
NRBC BLD MANUAL-RTO: 0 /100 WBC
PH UR STRIP: 6.5 [PH] (ref 5–7)
PLATELET # BLD AUTO: 381 K/MCL (ref 140–450)
POTASSIUM SERPL-SCNC: 2.3 MMOL/L (ref 3.4–5.1)
PROT SERPL-MCNC: 7.7 G/DL (ref 6.4–8.2)
PROT UR STRIP-MCNC: ABNORMAL MG/DL
RBC # BLD: 4.43 MIL/MCL (ref 4.5–5.9)
RBC #/AREA URNS HPF: ABNORMAL /HPF
RSV AG NPH QL IA.RAPID: NOT DETECTED
SARS-COV-2 RNA RESP QL NAA+PROBE: NOT DETECTED
SERVICE CMNT-IMP: NORMAL
SERVICE CMNT-IMP: NORMAL
SODIUM SERPL-SCNC: 138 MMOL/L (ref 135–145)
SP GR UR STRIP: >1.03 (ref 1–1.03)
SQUAMOUS #/AREA URNS HPF: ABNORMAL /HPF
UROBILINOGEN UR STRIP-MCNC: 2 MG/DL
WBC # BLD: 11.9 K/MCL (ref 4.2–11)
WBC #/AREA URNS HPF: ABNORMAL /HPF

## 2024-12-27 PROCEDURE — 93010 ELECTROCARDIOGRAM REPORT: CPT | Performed by: INTERNAL MEDICINE

## 2024-12-27 PROCEDURE — 93005 ELECTROCARDIOGRAM TRACING: CPT | Performed by: EMERGENCY MEDICINE

## 2024-12-27 PROCEDURE — 74177 CT ABD & PELVIS W/CONTRAST: CPT

## 2024-12-27 PROCEDURE — 99285 EMERGENCY DEPT VISIT HI MDM: CPT

## 2024-12-27 PROCEDURE — 87040 BLOOD CULTURE FOR BACTERIA: CPT | Performed by: INTERNAL MEDICINE

## 2024-12-27 PROCEDURE — 96366 THER/PROPH/DIAG IV INF ADDON: CPT

## 2024-12-27 PROCEDURE — 0241U COVID/FLU/RSV PANEL: CPT

## 2024-12-27 PROCEDURE — 10002800 HB RX 250 W HCPCS: Performed by: INTERNAL MEDICINE

## 2024-12-27 PROCEDURE — G0378 HOSPITAL OBSERVATION PER HR: HCPCS

## 2024-12-27 PROCEDURE — 80053 COMPREHEN METABOLIC PANEL: CPT | Performed by: PHYSICIAN ASSISTANT

## 2024-12-27 PROCEDURE — 36415 COLL VENOUS BLD VENIPUNCTURE: CPT | Performed by: STUDENT IN AN ORGANIZED HEALTH CARE EDUCATION/TRAINING PROGRAM

## 2024-12-27 PROCEDURE — 96365 THER/PROPH/DIAG IV INF INIT: CPT

## 2024-12-27 PROCEDURE — 73130 X-RAY EXAM OF HAND: CPT

## 2024-12-27 PROCEDURE — 10002800 HB RX 250 W HCPCS: Performed by: EMERGENCY MEDICINE

## 2024-12-27 PROCEDURE — 96367 TX/PROPH/DG ADDL SEQ IV INF: CPT

## 2024-12-27 PROCEDURE — 83690 ASSAY OF LIPASE: CPT | Performed by: PHYSICIAN ASSISTANT

## 2024-12-27 PROCEDURE — 82077 ASSAY SPEC XCP UR&BREATH IA: CPT | Performed by: STUDENT IN AN ORGANIZED HEALTH CARE EDUCATION/TRAINING PROGRAM

## 2024-12-27 PROCEDURE — 10002807 HB RX 258: Performed by: EMERGENCY MEDICINE

## 2024-12-27 PROCEDURE — 72148 MRI LUMBAR SPINE W/O DYE: CPT

## 2024-12-27 PROCEDURE — 10002803 HB RX 637: Performed by: INTERNAL MEDICINE

## 2024-12-27 PROCEDURE — 99223 1ST HOSP IP/OBS HIGH 75: CPT | Performed by: INTERNAL MEDICINE

## 2024-12-27 PROCEDURE — 10002805 HB CONTRAST AGENT: Performed by: EMERGENCY MEDICINE

## 2024-12-27 PROCEDURE — 83735 ASSAY OF MAGNESIUM: CPT | Performed by: PHYSICIAN ASSISTANT

## 2024-12-27 PROCEDURE — 10004651 HB RX, NO CHARGE ITEM: Performed by: INTERNAL MEDICINE

## 2024-12-27 PROCEDURE — 81001 URINALYSIS AUTO W/SCOPE: CPT | Performed by: PHYSICIAN ASSISTANT

## 2024-12-27 PROCEDURE — 10002807 HB RX 258: Performed by: INTERNAL MEDICINE

## 2024-12-27 PROCEDURE — 96375 TX/PRO/DX INJ NEW DRUG ADDON: CPT

## 2024-12-27 PROCEDURE — 10002803 HB RX 637: Performed by: EMERGENCY MEDICINE

## 2024-12-27 PROCEDURE — 85025 COMPLETE CBC W/AUTO DIFF WBC: CPT | Performed by: PHYSICIAN ASSISTANT

## 2024-12-27 RX ORDER — TRAZODONE HYDROCHLORIDE 50 MG/1
50 TABLET, FILM COATED ORAL NIGHTLY PRN
Status: DISCONTINUED | OUTPATIENT
Start: 2024-12-27 | End: 2024-12-27

## 2024-12-27 RX ORDER — ACETAMINOPHEN 325 MG/1
650 TABLET ORAL EVERY 4 HOURS PRN
Status: DISCONTINUED | OUTPATIENT
Start: 2024-12-27 | End: 2024-12-29 | Stop reason: HOSPADM

## 2024-12-27 RX ORDER — POTASSIUM CHLORIDE 14.9 MG/ML
20 INJECTION INTRAVENOUS ONCE
Status: COMPLETED | OUTPATIENT
Start: 2024-12-27 | End: 2024-12-27

## 2024-12-27 RX ORDER — 0.9 % SODIUM CHLORIDE 0.9 %
2 VIAL (ML) INJECTION EVERY 12 HOURS SCHEDULED
Status: DISCONTINUED | OUTPATIENT
Start: 2024-12-27 | End: 2024-12-29 | Stop reason: HOSPADM

## 2024-12-27 RX ORDER — ACETAMINOPHEN 650 MG/1
650 SUPPOSITORY RECTAL EVERY 4 HOURS PRN
Status: DISCONTINUED | OUTPATIENT
Start: 2024-12-27 | End: 2024-12-29 | Stop reason: HOSPADM

## 2024-12-27 RX ORDER — POTASSIUM CHLORIDE 14.9 MG/ML
20 INJECTION INTRAVENOUS ONCE
Status: DISCONTINUED | OUTPATIENT
Start: 2024-12-27 | End: 2024-12-29 | Stop reason: SDUPTHER

## 2024-12-27 RX ORDER — POLYETHYLENE GLYCOL 3350 17 G/17G
17 POWDER, FOR SOLUTION ORAL DAILY PRN
Status: DISCONTINUED | OUTPATIENT
Start: 2024-12-27 | End: 2024-12-29 | Stop reason: HOSPADM

## 2024-12-27 RX ORDER — NICOTINE 21 MG/24HR
1 PATCH, TRANSDERMAL 24 HOURS TRANSDERMAL ONCE
Status: COMPLETED | OUTPATIENT
Start: 2024-12-27 | End: 2024-12-28

## 2024-12-27 RX ORDER — NICOTINE 21 MG/24HR
1 PATCH, TRANSDERMAL 24 HOURS TRANSDERMAL DAILY
Status: DISCONTINUED | OUTPATIENT
Start: 2024-12-28 | End: 2024-12-28

## 2024-12-27 RX ORDER — LORAZEPAM 2 MG/ML
1 INJECTION INTRAMUSCULAR ONCE
Status: COMPLETED | OUTPATIENT
Start: 2024-12-27 | End: 2024-12-27

## 2024-12-27 RX ORDER — SODIUM CHLORIDE 9 MG/ML
INJECTION, SOLUTION INTRAVENOUS CONTINUOUS
Status: DISCONTINUED | OUTPATIENT
Start: 2024-12-27 | End: 2024-12-28

## 2024-12-27 RX ORDER — ONDANSETRON 2 MG/ML
4 INJECTION INTRAMUSCULAR; INTRAVENOUS EVERY 8 HOURS PRN
Status: CANCELLED | OUTPATIENT
Start: 2024-12-27

## 2024-12-27 RX ORDER — ALPRAZOLAM 0.25 MG/1
0.25 TABLET ORAL 3 TIMES DAILY
Status: DISCONTINUED | OUTPATIENT
Start: 2024-12-27 | End: 2024-12-28

## 2024-12-27 RX ORDER — 0.9 % SODIUM CHLORIDE 0.9 %
10 VIAL (ML) INJECTION PRN
Status: DISCONTINUED | OUTPATIENT
Start: 2024-12-27 | End: 2024-12-29 | Stop reason: HOSPADM

## 2024-12-27 RX ADMIN — ALPRAZOLAM 0.25 MG: 0.25 TABLET ORAL at 21:23

## 2024-12-27 RX ADMIN — PIPERACILLIN SODIUM AND TAZOBACTAM SODIUM 3.38 G: 3; .375 INJECTION, POWDER, FOR SOLUTION INTRAVENOUS at 16:51

## 2024-12-27 RX ADMIN — POTASSIUM CHLORIDE 20 MEQ: 14.9 INJECTION, SOLUTION INTRAVENOUS at 15:53

## 2024-12-27 RX ADMIN — POTASSIUM CHLORIDE 20 MEQ: 14.9 INJECTION, SOLUTION INTRAVENOUS at 13:51

## 2024-12-27 RX ADMIN — SODIUM CHLORIDE: 9 INJECTION, SOLUTION INTRAVENOUS at 19:18

## 2024-12-27 RX ADMIN — SODIUM CHLORIDE 1000 ML: 9 INJECTION, SOLUTION INTRAVENOUS at 13:51

## 2024-12-27 RX ADMIN — IOHEXOL 75 ML: 350 INJECTION, SOLUTION INTRAVENOUS at 14:30

## 2024-12-27 RX ADMIN — PIPERACILLIN SODIUM AND TAZOBACTAM SODIUM 3.38 G: 3; .375 INJECTION, POWDER, FOR SOLUTION INTRAVENOUS at 21:28

## 2024-12-27 RX ADMIN — Medication 1 PATCH: at 15:46

## 2024-12-27 RX ADMIN — LORAZEPAM 1 MG: 2 INJECTION INTRAMUSCULAR; INTRAVENOUS at 15:48

## 2024-12-27 RX ADMIN — SODIUM CHLORIDE, PRESERVATIVE FREE 2 ML: 5 INJECTION INTRAVENOUS at 21:29

## 2024-12-27 SDOH — SOCIAL STABILITY: SOCIAL INSECURITY: HOW OFTEN DOES ANYONE, INCLUDING FAMILY AND FRIENDS, INSULT OR TALK DOWN TO YOU?: NEVER

## 2024-12-27 SDOH — SOCIAL STABILITY: SOCIAL INSECURITY: HOW OFTEN DOES ANYONE, INCLUDING FAMILY AND FRIENDS, THREATEN YOU WITH HARM?: NEVER

## 2024-12-27 SDOH — SOCIAL STABILITY: SOCIAL INSECURITY: HOW OFTEN DOES ANYONE, INCLUDING FAMILY AND FRIENDS, SCREAM OR CURSE AT YOU?: NEVER

## 2024-12-27 SDOH — ECONOMIC STABILITY: FOOD INSECURITY: WITHIN THE PAST 12 MONTHS, THE FOOD YOU BOUGHT JUST DIDN'T LAST AND YOU DIDN'T HAVE MONEY TO GET MORE.: NEVER TRUE

## 2024-12-27 SDOH — SOCIAL STABILITY: SOCIAL INSECURITY: HOW OFTEN DOES ANYONE, INCLUDING FAMILY AND FRIENDS, PHYSICALLY HURT YOU?: NEVER

## 2024-12-27 SDOH — ECONOMIC STABILITY: HOUSING INSECURITY: WHAT IS YOUR LIVING SITUATION TODAY?: I HAVE A STEADY PLACE TO LIVE

## 2024-12-27 SDOH — ECONOMIC STABILITY: HOUSING INSECURITY: WHAT IS YOUR LIVING SITUATION TODAY?: ALONE

## 2024-12-27 SDOH — ECONOMIC STABILITY: GENERAL

## 2024-12-27 SDOH — HEALTH STABILITY: GENERAL
BECAUSE OF A PHYSICAL, MENTAL, OR EMOTIONAL CONDITION, DO YOU HAVE SERIOUS DIFFICULTY CONCENTRATING, REMEMBERING OR MAKING DECISIONS?: NO

## 2024-12-27 SDOH — ECONOMIC STABILITY: TRANSPORTATION INSECURITY
IN THE PAST 12 MONTHS, HAS LACK OF RELIABLE TRANSPORTATION KEPT YOU FROM MEDICAL APPOINTMENTS, MEETINGS, WORK OR FROM GETTING THINGS NEEDED FOR DAILY LIVING?: NO

## 2024-12-27 SDOH — HEALTH STABILITY: PHYSICAL HEALTH: DO YOU HAVE DIFFICULTY DRESSING OR BATHING?: NO

## 2024-12-27 SDOH — ECONOMIC STABILITY: INCOME INSECURITY: IN THE PAST 12 MONTHS, HAS THE ELECTRIC, GAS, OIL, OR WATER COMPANY THREATENED TO SHUT OFF SERVICE IN YOUR HOME?: NO

## 2024-12-27 SDOH — ECONOMIC STABILITY: HOUSING INSECURITY: WHAT IS YOUR LIVING SITUATION TODAY?: HOUSE

## 2024-12-27 SDOH — ECONOMIC STABILITY: HOUSING INSECURITY: DO YOU HAVE PROBLEMS WITH ANY OF THE FOLLOWING?: NONE OF THE ABOVE

## 2024-12-27 SDOH — HEALTH STABILITY: GENERAL: BECAUSE OF A PHYSICAL, MENTAL, OR EMOTIONAL CONDITION, DO YOU HAVE DIFFICULTY DOING ERRANDS ALONE?: NO

## 2024-12-27 SDOH — SOCIAL STABILITY: SOCIAL NETWORK: SUPPORT SYSTEMS: FAMILY MEMBERS

## 2024-12-27 SDOH — ECONOMIC STABILITY: GENERAL: WOULD YOU LIKE HELP WITH ANY OF THE FOLLOWING NEEDS?: I DON'T WANT HELP WITH ANY OF THESE

## 2024-12-27 SDOH — HEALTH STABILITY: PHYSICAL HEALTH: DO YOU HAVE SERIOUS DIFFICULTY WALKING OR CLIMBING STAIRS?: NO

## 2024-12-27 ASSESSMENT — PAIN SCALES - GENERAL
PAINLEVEL_OUTOF10: 0

## 2024-12-27 ASSESSMENT — PATIENT HEALTH QUESTIONNAIRE - PHQ9: IS PATIENT ABLE TO COMPLETE PHQ2 OR PHQ9: YES

## 2024-12-28 ENCOUNTER — APPOINTMENT (OUTPATIENT)
Dept: MRI IMAGING | Age: 69
DRG: 435 | End: 2024-12-28
Attending: INTERNAL MEDICINE

## 2024-12-28 ENCOUNTER — APPOINTMENT (OUTPATIENT)
Dept: MRI IMAGING | Age: 69
DRG: 435 | End: 2024-12-28
Attending: STUDENT IN AN ORGANIZED HEALTH CARE EDUCATION/TRAINING PROGRAM

## 2024-12-28 VITALS
HEART RATE: 67 BPM | WEIGHT: 180 LBS | SYSTOLIC BLOOD PRESSURE: 158 MMHG | DIASTOLIC BLOOD PRESSURE: 86 MMHG | BODY MASS INDEX: 26.66 KG/M2 | OXYGEN SATURATION: 97 % | TEMPERATURE: 97.9 F | HEIGHT: 69 IN | RESPIRATION RATE: 20 BRPM

## 2024-12-28 LAB
AFP-TM SERPL-MCNC: 3933 NG/ML
ALBUMIN SERPL-MCNC: 2.2 G/DL (ref 3.4–5)
ALBUMIN/GLOB SERPL: 0.6 {RATIO} (ref 1–2.4)
ALP SERPL-CCNC: 60 UNITS/L (ref 45–117)
ALT SERPL-CCNC: 8 UNITS/L
ANION GAP SERPL CALC-SCNC: 12 MMOL/L (ref 7–19)
AST SERPL-CCNC: 26 UNITS/L
ATRIAL RATE (BPM): 62
ATRIAL RATE (BPM): 68
BACTERIA BLD CULT: NORMAL
BACTERIA BLD CULT: NORMAL
BASOPHILS # BLD: 0.1 K/MCL (ref 0–0.3)
BASOPHILS NFR BLD: 1 %
BILIRUB SERPL-MCNC: 0.4 MG/DL (ref 0.2–1)
BUN SERPL-MCNC: 7 MG/DL (ref 6–20)
BUN/CREAT SERPL: 7 (ref 7–25)
CALCIUM SERPL-MCNC: 8.1 MG/DL (ref 8.4–10.2)
CEA SERPL-MCNC: <2 NG/ML (ref 0–5)
CHLORIDE SERPL-SCNC: 102 MMOL/L (ref 97–110)
CO2 SERPL-SCNC: 30 MMOL/L (ref 21–32)
CREAT SERPL-MCNC: 1.04 MG/DL (ref 0.67–1.17)
CRP SERPL-MCNC: 42.7 MG/L
DEPRECATED RDW RBC: 45.1 FL (ref 39–50)
EGFRCR SERPLBLD CKD-EPI 2021: 78 ML/MIN/{1.73_M2}
EOSINOPHIL # BLD: 0.2 K/MCL (ref 0–0.5)
EOSINOPHIL NFR BLD: 3 %
ERYTHROCYTE [DISTWIDTH] IN BLOOD: 13.3 % (ref 11–15)
ERYTHROCYTE [SEDIMENTATION RATE] IN BLOOD BY WESTERGREN METHOD: 76 MM/HR (ref 0–20)
FASTING DURATION TIME PATIENT: ABNORMAL H
GLOBULIN SER-MCNC: 4 G/DL (ref 2–4)
GLUCOSE SERPL-MCNC: 94 MG/DL (ref 70–99)
HCT VFR BLD CALC: 35.4 % (ref 39–51)
HGB BLD-MCNC: 12 G/DL (ref 13–17)
IMM GRANULOCYTES # BLD AUTO: 0.1 K/MCL (ref 0–0.2)
IMM GRANULOCYTES # BLD: 1 %
INR PPP: 1.2
LYMPHOCYTES # BLD: 1.3 K/MCL (ref 1–4)
LYMPHOCYTES NFR BLD: 16 %
MAGNESIUM SERPL-MCNC: 2.1 MG/DL (ref 1.7–2.4)
MCH RBC QN AUTO: 31.1 PG (ref 26–34)
MCHC RBC AUTO-ENTMCNC: 33.9 G/DL (ref 32–36.5)
MCV RBC AUTO: 91.7 FL (ref 78–100)
MONOCYTES # BLD: 0.6 K/MCL (ref 0.3–0.9)
MONOCYTES NFR BLD: 7 %
NEUTROPHILS # BLD: 6 K/MCL (ref 1.8–7.7)
NEUTROPHILS NFR BLD: 72 %
NRBC BLD MANUAL-RTO: 0 /100 WBC
P AXIS (DEGREES): 30
P AXIS (DEGREES): 55
PHOSPHATE SERPL-MCNC: 3.6 MG/DL (ref 2.4–4.7)
PLATELET # BLD AUTO: 308 K/MCL (ref 140–450)
POTASSIUM SERPL-SCNC: 2.7 MMOL/L (ref 3.4–5.1)
POTASSIUM SERPL-SCNC: 2.9 MMOL/L (ref 3.4–5.1)
PR-INTERVAL (MSEC): 124
PR-INTERVAL (MSEC): 142
PROT SERPL-MCNC: 6.2 G/DL (ref 6.4–8.2)
PROTHROMBIN TIME: 12.1 SEC (ref 9.7–11.8)
QRS-INTERVAL (MSEC): 110
QRS-INTERVAL (MSEC): 118
QT-INTERVAL (MSEC): 506
QT-INTERVAL (MSEC): 656
QTC: 538
QTC: 666
R AXIS (DEGREES): 15
R AXIS (DEGREES): 78
RBC # BLD: 3.86 MIL/MCL (ref 4.5–5.9)
REPORT TEXT: NORMAL
REPORT TEXT: NORMAL
SODIUM SERPL-SCNC: 141 MMOL/L (ref 135–145)
T AXIS (DEGREES): 23
T AXIS (DEGREES): 49
VENTRICULAR RATE EKG/MIN (BPM): 62
VENTRICULAR RATE EKG/MIN (BPM): 68
WBC # BLD: 8.2 K/MCL (ref 4.2–11)

## 2024-12-28 PROCEDURE — 84132 ASSAY OF SERUM POTASSIUM: CPT | Performed by: INTERNAL MEDICINE

## 2024-12-28 PROCEDURE — 85610 PROTHROMBIN TIME: CPT | Performed by: INTERNAL MEDICINE

## 2024-12-28 PROCEDURE — 93010 ELECTROCARDIOGRAM REPORT: CPT | Performed by: INTERNAL MEDICINE

## 2024-12-28 PROCEDURE — 82105 ALPHA-FETOPROTEIN SERUM: CPT | Performed by: INTERNAL MEDICINE

## 2024-12-28 PROCEDURE — A9577 INJ MULTIHANCE: HCPCS | Performed by: STUDENT IN AN ORGANIZED HEALTH CARE EDUCATION/TRAINING PROGRAM

## 2024-12-28 PROCEDURE — 10002807 HB RX 258: Performed by: INTERNAL MEDICINE

## 2024-12-28 PROCEDURE — 86301 IMMUNOASSAY TUMOR CA 19-9: CPT | Performed by: INTERNAL MEDICINE

## 2024-12-28 PROCEDURE — 82378 CARCINOEMBRYONIC ANTIGEN: CPT | Performed by: INTERNAL MEDICINE

## 2024-12-28 PROCEDURE — 36415 COLL VENOUS BLD VENIPUNCTURE: CPT | Performed by: INTERNAL MEDICINE

## 2024-12-28 PROCEDURE — 70553 MRI BRAIN STEM W/O & W/DYE: CPT

## 2024-12-28 PROCEDURE — 85025 COMPLETE CBC W/AUTO DIFF WBC: CPT | Performed by: INTERNAL MEDICINE

## 2024-12-28 PROCEDURE — 83735 ASSAY OF MAGNESIUM: CPT | Performed by: INTERNAL MEDICINE

## 2024-12-28 PROCEDURE — 10002803 HB RX 637: Performed by: INTERNAL MEDICINE

## 2024-12-28 PROCEDURE — 74183 MRI ABD W/O CNTR FLWD CNTR: CPT

## 2024-12-28 PROCEDURE — 93005 ELECTROCARDIOGRAM TRACING: CPT | Performed by: INTERNAL MEDICINE

## 2024-12-28 PROCEDURE — 10002805 HB CONTRAST AGENT: Performed by: STUDENT IN AN ORGANIZED HEALTH CARE EDUCATION/TRAINING PROGRAM

## 2024-12-28 PROCEDURE — G0378 HOSPITAL OBSERVATION PER HR: HCPCS

## 2024-12-28 PROCEDURE — 84100 ASSAY OF PHOSPHORUS: CPT | Performed by: INTERNAL MEDICINE

## 2024-12-28 PROCEDURE — 10002801 HB RX 250 W/O HCPCS: Performed by: INTERNAL MEDICINE

## 2024-12-28 PROCEDURE — 10004651 HB RX, NO CHARGE ITEM: Performed by: INTERNAL MEDICINE

## 2024-12-28 PROCEDURE — 80053 COMPREHEN METABOLIC PANEL: CPT | Performed by: INTERNAL MEDICINE

## 2024-12-28 PROCEDURE — 86140 C-REACTIVE PROTEIN: CPT | Performed by: INTERNAL MEDICINE

## 2024-12-28 PROCEDURE — 10002800 HB RX 250 W HCPCS: Performed by: INTERNAL MEDICINE

## 2024-12-28 PROCEDURE — 85652 RBC SED RATE AUTOMATED: CPT | Performed by: INTERNAL MEDICINE

## 2024-12-28 PROCEDURE — 99233 SBSQ HOSP IP/OBS HIGH 50: CPT | Performed by: INTERNAL MEDICINE

## 2024-12-28 PROCEDURE — 10006031 HB ROOM CHARGE TELEMETRY

## 2024-12-28 RX ORDER — POTASSIUM CHLORIDE 14.9 MG/ML
20 INJECTION INTRAVENOUS ONCE
Status: COMPLETED | OUTPATIENT
Start: 2024-12-28 | End: 2024-12-28

## 2024-12-28 RX ORDER — DEXTROSE MONOHYDRATE AND SODIUM CHLORIDE 5; .45 G/100ML; G/100ML
INJECTION, SOLUTION INTRAVENOUS CONTINUOUS
Status: DISCONTINUED | OUTPATIENT
Start: 2024-12-28 | End: 2024-12-28

## 2024-12-28 RX ORDER — ALPRAZOLAM 0.25 MG/1
0.25 TABLET ORAL EVERY 8 HOURS PRN
Status: DISCONTINUED | OUTPATIENT
Start: 2024-12-28 | End: 2024-12-29 | Stop reason: HOSPADM

## 2024-12-28 RX ORDER — 0.9 % SODIUM CHLORIDE 0.9 %
10 VIAL (ML) INJECTION PRN
Status: DISCONTINUED | OUTPATIENT
Start: 2024-12-28 | End: 2024-12-29 | Stop reason: HOSPADM

## 2024-12-28 RX ORDER — POTASSIUM CHLORIDE 1500 MG/1
20 TABLET, EXTENDED RELEASE ORAL ONCE
Status: COMPLETED | OUTPATIENT
Start: 2024-12-28 | End: 2024-12-28

## 2024-12-28 RX ORDER — NICOTINE 21 MG/24HR
1 PATCH, TRANSDERMAL 24 HOURS TRANSDERMAL DAILY
Status: DISCONTINUED | OUTPATIENT
Start: 2024-12-28 | End: 2024-12-29 | Stop reason: HOSPADM

## 2024-12-28 RX ORDER — HYDRALAZINE HYDROCHLORIDE 20 MG/ML
10 INJECTION INTRAMUSCULAR; INTRAVENOUS EVERY 6 HOURS PRN
Status: DISCONTINUED | OUTPATIENT
Start: 2024-12-28 | End: 2024-12-29 | Stop reason: HOSPADM

## 2024-12-28 RX ADMIN — ALPRAZOLAM 0.25 MG: 0.25 TABLET ORAL at 17:53

## 2024-12-28 RX ADMIN — ALPRAZOLAM 0.25 MG: 0.25 TABLET ORAL at 08:26

## 2024-12-28 RX ADMIN — Medication 1 PATCH: at 08:27

## 2024-12-28 RX ADMIN — PIPERACILLIN SODIUM AND TAZOBACTAM SODIUM 3.38 G: 3; .375 INJECTION, POWDER, FOR SOLUTION INTRAVENOUS at 21:02

## 2024-12-28 RX ADMIN — ACETAMINOPHEN 650 MG: 325 TABLET ORAL at 23:03

## 2024-12-28 RX ADMIN — POTASSIUM CHLORIDE 20 MEQ: 14.9 INJECTION, SOLUTION INTRAVENOUS at 13:13

## 2024-12-28 RX ADMIN — DEXTROSE AND SODIUM CHLORIDE: 5; 450 INJECTION, SOLUTION INTRAVENOUS at 08:52

## 2024-12-28 RX ADMIN — PIPERACILLIN SODIUM AND TAZOBACTAM SODIUM 3.38 G: 3; .375 INJECTION, POWDER, FOR SOLUTION INTRAVENOUS at 05:50

## 2024-12-28 RX ADMIN — POTASSIUM CHLORIDE 20 MEQ: 1500 TABLET, EXTENDED RELEASE ORAL at 18:04

## 2024-12-28 RX ADMIN — PIPERACILLIN SODIUM AND TAZOBACTAM SODIUM 3.37 G: 3; .375 INJECTION, POWDER, FOR SOLUTION INTRAVENOUS at 15:34

## 2024-12-28 RX ADMIN — GADOBENATE DIMEGLUMINE 20 ML: 529 INJECTION, SOLUTION INTRAVENOUS at 10:59

## 2024-12-28 RX ADMIN — SODIUM CHLORIDE, PRESERVATIVE FREE 2 ML: 5 INJECTION INTRAVENOUS at 20:54

## 2024-12-28 RX ADMIN — HYDROMORPHONE HYDROCHLORIDE 0.5 MG: 1 INJECTION, SOLUTION INTRAMUSCULAR; INTRAVENOUS; SUBCUTANEOUS at 10:05

## 2024-12-28 RX ADMIN — Medication 6 MG: at 23:03

## 2024-12-28 RX ADMIN — NICOTINE 1 PATCH: 21 PATCH, EXTENDED RELEASE TRANSDERMAL at 12:29

## 2024-12-28 RX ADMIN — POTASSIUM CHLORIDE 20 MEQ: 1500 TABLET, EXTENDED RELEASE ORAL at 08:41

## 2024-12-28 RX ADMIN — POTASSIUM CHLORIDE 20 MEQ: 14.9 INJECTION, SOLUTION INTRAVENOUS at 18:10

## 2024-12-28 RX ADMIN — POTASSIUM CHLORIDE 20 MEQ: 14.9 INJECTION, SOLUTION INTRAVENOUS at 20:43

## 2024-12-28 RX ADMIN — POTASSIUM CHLORIDE 20 MEQ: 14.9 INJECTION, SOLUTION INTRAVENOUS at 10:00

## 2024-12-28 SDOH — SOCIAL STABILITY: SOCIAL NETWORK
HOW OFTEN DO YOU SEE OR TALK TO PEOPLE THAT YOU CARE ABOUT AND FEEL CLOSE TO? (FOR EXAMPLE: TALKING TO FRIENDS ON THE PHONE, VISITING FRIENDS OR FAMILY, GOING TO CHURCH OR CLUB MEETINGS): 3 TO 5 TIMES A WEEK

## 2024-12-28 ASSESSMENT — ORIENTATION MEMORY CONCENTRATION TEST (OMCT)
OMCT INTERPRETATION: 0-6: NO SIGNIFICANT IMPAIRMENT
COUNT BACKWARDS FROM 20 TO 1: CORRECT
REPEAT THE NAME AND ADDRESS I ASKED YOU TO REMEMBER: CORRECT
WHAT MONTH IS IT NOW: CORRECT
OMCT SCORE: 0
SAY THE MONTHS IN REVERSE ORDER STARTING WITH LAST MONTH: CORRECT
WHAT YEAR IS IT NOW (MUST BE EXACT): CORRECT
WHAT TIME IS IT (NO WATCH OR CLOCK): CORRECT

## 2024-12-28 ASSESSMENT — PATIENT HEALTH QUESTIONNAIRE - PHQ9
IS PATIENT ABLE TO COMPLETE PHQ2 OR PHQ9: YES
SUM OF ALL RESPONSES TO PHQ9 QUESTIONS 1 AND 2: 0
SUM OF ALL RESPONSES TO PHQ9 QUESTIONS 1 AND 2: 0
CLINICAL INTERPRETATION OF PHQ2 SCORE: NO FURTHER SCREENING NEEDED
1. LITTLE INTEREST OR PLEASURE IN DOING THINGS: NOT AT ALL
2. FEELING DOWN, DEPRESSED OR HOPELESS: NOT AT ALL

## 2024-12-28 ASSESSMENT — LIFESTYLE VARIABLES
ALCOHOL_USE_STATUS: UNHEALTHY DRINKING IDENTIFIED. AUDIT C: 3 OR MORE FOR WOMEN AND 4 OR MORE FOR MEN.
HOW OFTEN DO YOU HAVE A DRINK CONTAINING ALCOHOL: 2 TO 3 TIMES PER WEEK
AUDIT-C TOTAL SCORE: 4
HOW OFTEN DO YOU HAVE 6 OR MORE DRINKS ON ONE OCCASION: NEVER
HOW MANY STANDARD DRINKS CONTAINING ALCOHOL DO YOU HAVE ON A TYPICAL DAY: 3 OR 4

## 2024-12-28 ASSESSMENT — ACTIVITIES OF DAILY LIVING (ADL)
ADL_BEFORE_ADMISSION: INDEPENDENT
ADL_SHORT_OF_BREATH: NO
TOILETING: NEEDS ASSISTANCE
BATHING: NEEDS ASSISTANCE
RECENT_DECLINE_ADL: YES, ACUTE ILLNESS WITHOUT THERAPY NEEDS
DRESSING: NEEDS ASSISTANCE
FEEDING: INDEPENDENT
ADL_SCORE: 20

## 2024-12-28 ASSESSMENT — PAIN SCALES - GENERAL
PAINLEVEL_OUTOF10: 0
PAINLEVEL_OUTOF10: 8
PAINLEVEL_OUTOF10: 3

## 2024-12-28 ASSESSMENT — PAIN DESCRIPTION - PAIN TYPE: TYPE: ACUTE PAIN

## 2024-12-29 VITALS
TEMPERATURE: 97.3 F | HEIGHT: 69 IN | HEART RATE: 57 BPM | WEIGHT: 180 LBS | RESPIRATION RATE: 17 BRPM | OXYGEN SATURATION: 98 % | BODY MASS INDEX: 26.66 KG/M2 | SYSTOLIC BLOOD PRESSURE: 144 MMHG | DIASTOLIC BLOOD PRESSURE: 74 MMHG

## 2024-12-29 LAB
AFP-TM SERPL-MCNC: 4988 NG/ML
ALBUMIN SERPL-MCNC: 2.6 G/DL (ref 3.4–5)
ALBUMIN/GLOB SERPL: 0.6 {RATIO} (ref 1–2.4)
ALP SERPL-CCNC: 68 UNITS/L (ref 45–117)
ALT SERPL-CCNC: 14 UNITS/L
ANION GAP SERPL CALC-SCNC: 10 MMOL/L (ref 7–19)
AST SERPL-CCNC: 29 UNITS/L
BASOPHILS # BLD: 0 K/MCL (ref 0–0.3)
BASOPHILS NFR BLD: 1 %
BILIRUB SERPL-MCNC: 0.4 MG/DL (ref 0.2–1)
BUN SERPL-MCNC: <5 MG/DL (ref 6–20)
BUN/CREAT SERPL: ABNORMAL
CALCIUM SERPL-MCNC: 8.4 MG/DL (ref 8.4–10.2)
CANCER AG19-9 SERPL-ACNC: 31 UNITS/ML (ref 0–35)
CHLORIDE SERPL-SCNC: 103 MMOL/L (ref 97–110)
CO2 SERPL-SCNC: 31 MMOL/L (ref 21–32)
CREAT SERPL-MCNC: 0.78 MG/DL (ref 0.67–1.17)
DEPRECATED RDW RBC: 44.7 FL (ref 39–50)
EGFRCR SERPLBLD CKD-EPI 2021: >90 ML/MIN/{1.73_M2}
EOSINOPHIL # BLD: 0.2 K/MCL (ref 0–0.5)
EOSINOPHIL NFR BLD: 3 %
ERYTHROCYTE [DISTWIDTH] IN BLOOD: 13.2 % (ref 11–15)
FASTING DURATION TIME PATIENT: ABNORMAL H
GLOBULIN SER-MCNC: 4.2 G/DL (ref 2–4)
GLUCOSE SERPL-MCNC: 100 MG/DL (ref 70–99)
HCT VFR BLD CALC: 36.3 % (ref 39–51)
HGB BLD-MCNC: 12.1 G/DL (ref 13–17)
IMM GRANULOCYTES # BLD AUTO: 0 K/MCL (ref 0–0.2)
IMM GRANULOCYTES # BLD: 1 %
LDH SERPL L TO P-CCNC: 331 UNITS/L (ref 86–234)
LYMPHOCYTES # BLD: 1.2 K/MCL (ref 1–4)
LYMPHOCYTES NFR BLD: 14 %
MAGNESIUM SERPL-MCNC: 2 MG/DL (ref 1.7–2.4)
MCH RBC QN AUTO: 31 PG (ref 26–34)
MCHC RBC AUTO-ENTMCNC: 33.3 G/DL (ref 32–36.5)
MCV RBC AUTO: 93.1 FL (ref 78–100)
MONOCYTES # BLD: 0.7 K/MCL (ref 0.3–0.9)
MONOCYTES NFR BLD: 7 %
NEUTROPHILS # BLD: 6.6 K/MCL (ref 1.8–7.7)
NEUTROPHILS NFR BLD: 74 %
NRBC BLD MANUAL-RTO: 0 /100 WBC
PLATELET # BLD AUTO: 314 K/MCL (ref 140–450)
POTASSIUM SERPL-SCNC: 3.1 MMOL/L (ref 3.4–5.1)
POTASSIUM SERPL-SCNC: 3.3 MMOL/L (ref 3.4–5.1)
POTASSIUM SERPL-SCNC: 3.7 MMOL/L (ref 3.4–5.1)
PROT SERPL-MCNC: 6.8 G/DL (ref 6.4–8.2)
RBC # BLD: 3.9 MIL/MCL (ref 4.5–5.9)
SODIUM SERPL-SCNC: 141 MMOL/L (ref 135–145)
WBC # BLD: 8.8 K/MCL (ref 4.2–11)

## 2024-12-29 PROCEDURE — 36415 COLL VENOUS BLD VENIPUNCTURE: CPT | Performed by: INTERNAL MEDICINE

## 2024-12-29 PROCEDURE — 83615 LACTATE (LD) (LDH) ENZYME: CPT | Performed by: INTERNAL MEDICINE

## 2024-12-29 PROCEDURE — 80053 COMPREHEN METABOLIC PANEL: CPT | Performed by: INTERNAL MEDICINE

## 2024-12-29 PROCEDURE — 85025 COMPLETE CBC W/AUTO DIFF WBC: CPT | Performed by: INTERNAL MEDICINE

## 2024-12-29 PROCEDURE — 83735 ASSAY OF MAGNESIUM: CPT | Performed by: INTERNAL MEDICINE

## 2024-12-29 PROCEDURE — 84132 ASSAY OF SERUM POTASSIUM: CPT | Performed by: INTERNAL MEDICINE

## 2024-12-29 PROCEDURE — 99239 HOSP IP/OBS DSCHRG MGMT >30: CPT | Performed by: INTERNAL MEDICINE

## 2024-12-29 PROCEDURE — 10002803 HB RX 637: Performed by: INTERNAL MEDICINE

## 2024-12-29 PROCEDURE — 10002800 HB RX 250 W HCPCS: Performed by: INTERNAL MEDICINE

## 2024-12-29 PROCEDURE — 10004651 HB RX, NO CHARGE ITEM: Performed by: INTERNAL MEDICINE

## 2024-12-29 PROCEDURE — 10002807 HB RX 258: Performed by: INTERNAL MEDICINE

## 2024-12-29 RX ORDER — POTASSIUM CHLORIDE 1500 MG/1
40 TABLET, EXTENDED RELEASE ORAL ONCE
Status: COMPLETED | OUTPATIENT
Start: 2024-12-29 | End: 2024-12-29

## 2024-12-29 RX ORDER — POTASSIUM CHLORIDE 14.9 MG/ML
20 INJECTION INTRAVENOUS ONCE
Status: COMPLETED | OUTPATIENT
Start: 2024-12-29 | End: 2024-12-29

## 2024-12-29 RX ORDER — POTASSIUM CHLORIDE 14.9 MG/ML
20 INJECTION INTRAVENOUS ONCE
Status: DISCONTINUED | OUTPATIENT
Start: 2024-12-29 | End: 2024-12-29

## 2024-12-29 RX ADMIN — POTASSIUM CHLORIDE 40 MEQ: 1500 TABLET, EXTENDED RELEASE ORAL at 04:28

## 2024-12-29 RX ADMIN — POTASSIUM CHLORIDE 20 MEQ: 14.9 INJECTION, SOLUTION INTRAVENOUS at 01:49

## 2024-12-29 RX ADMIN — ALPRAZOLAM 0.25 MG: 0.25 TABLET ORAL at 01:52

## 2024-12-29 RX ADMIN — ACETAMINOPHEN 650 MG: 325 TABLET ORAL at 04:30

## 2024-12-29 RX ADMIN — SODIUM CHLORIDE, PRESERVATIVE FREE 2 ML: 5 INJECTION INTRAVENOUS at 08:37

## 2024-12-29 RX ADMIN — PIPERACILLIN SODIUM AND TAZOBACTAM SODIUM 3.38 G: 3; .375 INJECTION, POWDER, FOR SOLUTION INTRAVENOUS at 05:21

## 2024-12-29 RX ADMIN — NICOTINE 1 PATCH: 21 PATCH, EXTENDED RELEASE TRANSDERMAL at 08:33

## 2024-12-29 RX ADMIN — POTASSIUM CHLORIDE 40 MEQ: 1500 TABLET, EXTENDED RELEASE ORAL at 10:35

## 2024-12-29 ASSESSMENT — PAIN DESCRIPTION - PAIN TYPE: TYPE: ACUTE PAIN

## 2024-12-29 ASSESSMENT — PAIN SCALES - GENERAL
PAINLEVEL_OUTOF10: 1
PAINLEVEL_OUTOF10: 2
PAINLEVEL_OUTOF10: 3
PAINLEVEL_OUTOF10: 1

## 2024-12-30 LAB
ATRIAL RATE (BPM): 62
ATRIAL RATE (BPM): 68
P AXIS (DEGREES): 30
P AXIS (DEGREES): 55
PR-INTERVAL (MSEC): 124
PR-INTERVAL (MSEC): 142
QRS-INTERVAL (MSEC): 110
QRS-INTERVAL (MSEC): 118
QT-INTERVAL (MSEC): 506
QT-INTERVAL (MSEC): 656
QTC: 538
QTC: 666
R AXIS (DEGREES): 15
R AXIS (DEGREES): 78
REPORT TEXT: NORMAL
REPORT TEXT: NORMAL
T AXIS (DEGREES): 23
T AXIS (DEGREES): 49
VENTRICULAR RATE EKG/MIN (BPM): 62
VENTRICULAR RATE EKG/MIN (BPM): 68

## 2025-01-01 LAB
BACTERIA BLD CULT: NORMAL
BACTERIA BLD CULT: NORMAL

## 2025-01-09 ENCOUNTER — CASE MANAGEMENT (OUTPATIENT)
Dept: CARE COORDINATION | Age: 70
End: 2025-01-09

## 2025-01-13 ENCOUNTER — HOSPITAL ENCOUNTER (OUTPATIENT)
Dept: CARDIOLOGY | Age: 70
Discharge: HOME | End: 2025-01-13

## 2025-01-13 VITALS
SYSTOLIC BLOOD PRESSURE: 151 MMHG | OXYGEN SATURATION: 99 % | HEIGHT: 70 IN | WEIGHT: 196.65 LBS | TEMPERATURE: 97 F | RESPIRATION RATE: 22 BRPM | BODY MASS INDEX: 28.15 KG/M2 | HEART RATE: 78 BPM | DIASTOLIC BLOOD PRESSURE: 79 MMHG

## 2025-01-13 DIAGNOSIS — K76.9 LIVER LESION: ICD-10-CM

## 2025-01-13 LAB
BASOPHILS # BLD: 0 K/MCL (ref 0–0.3)
BASOPHILS NFR BLD: 0 %
DEPRECATED RDW RBC: 45.8 FL (ref 39–50)
EOSINOPHIL # BLD: 0.2 K/MCL (ref 0–0.5)
EOSINOPHIL NFR BLD: 2 %
ERYTHROCYTE [DISTWIDTH] IN BLOOD: 13.5 % (ref 11–15)
HCT VFR BLD CALC: 38.1 % (ref 39–51)
HGB BLD-MCNC: 12.9 G/DL (ref 13–17)
IMM GRANULOCYTES # BLD AUTO: 0 K/MCL (ref 0–0.2)
IMM GRANULOCYTES # BLD: 0 %
INR PPP: 1.2
LYMPHOCYTES # BLD: 1.1 K/MCL (ref 1–4)
LYMPHOCYTES NFR BLD: 11 %
MCH RBC QN AUTO: 31.2 PG (ref 26–34)
MCHC RBC AUTO-ENTMCNC: 33.9 G/DL (ref 32–36.5)
MCV RBC AUTO: 92.3 FL (ref 78–100)
MONOCYTES # BLD: 0.6 K/MCL (ref 0.3–0.9)
MONOCYTES NFR BLD: 6 %
NEUTROPHILS # BLD: 8.1 K/MCL (ref 1.8–7.7)
NEUTROPHILS NFR BLD: 81 %
NRBC BLD MANUAL-RTO: 0 /100 WBC
PLATELET # BLD AUTO: 283 K/MCL (ref 140–450)
PROTHROMBIN TIME: 11.9 SEC (ref 9.7–11.8)
RAINBOW EXTRA TUBES HOLD SPECIMEN: NORMAL
RBC # BLD: 4.13 MIL/MCL (ref 4.5–5.9)
WBC # BLD: 10.1 K/MCL (ref 4.2–11)

## 2025-01-13 PROCEDURE — 85610 PROTHROMBIN TIME: CPT

## 2025-01-13 PROCEDURE — 10006023 HB SUPPLY 272

## 2025-01-13 PROCEDURE — 10002807 HB RX 258

## 2025-01-13 PROCEDURE — 99152 MOD SED SAME PHYS/QHP 5/>YRS: CPT

## 2025-01-13 PROCEDURE — 10002801 HB RX 250 W/O HCPCS: Performed by: RADIOLOGY

## 2025-01-13 PROCEDURE — 76942 ECHO GUIDE FOR BIOPSY: CPT

## 2025-01-13 PROCEDURE — 47000 NEEDLE BIOPSY OF LIVER PERQ: CPT

## 2025-01-13 PROCEDURE — 10002800 HB RX 250 W HCPCS: Performed by: RADIOLOGY

## 2025-01-13 PROCEDURE — 13000001 HB PHASE II RECOVERY EA 30 MINUTES

## 2025-01-13 PROCEDURE — 85025 COMPLETE CBC W/AUTO DIFF WBC: CPT

## 2025-01-13 PROCEDURE — 88342 IMHCHEM/IMCYTCHM 1ST ANTB: CPT | Performed by: RADIOLOGY

## 2025-01-13 RX ORDER — 0.9 % SODIUM CHLORIDE 0.9 %
10 VIAL (ML) INJECTION PRN
Status: DISCONTINUED | OUTPATIENT
Start: 2025-01-13 | End: 2025-01-15 | Stop reason: HOSPADM

## 2025-01-13 RX ORDER — MIDAZOLAM HYDROCHLORIDE 1 MG/ML
INJECTION, SOLUTION INTRAMUSCULAR; INTRAVENOUS PRN
Status: DISCONTINUED | OUTPATIENT
Start: 2025-01-13 | End: 2025-01-13 | Stop reason: HOSPADM

## 2025-01-13 RX ORDER — 0.9 % SODIUM CHLORIDE 0.9 %
2 VIAL (ML) INJECTION EVERY 12 HOURS SCHEDULED
Status: DISCONTINUED | OUTPATIENT
Start: 2025-01-13 | End: 2025-01-15 | Stop reason: HOSPADM

## 2025-01-13 RX ORDER — 0.9 % SODIUM CHLORIDE 0.9 %
10 VIAL (ML) INJECTION PRN
Status: CANCELLED | OUTPATIENT
Start: 2025-01-13

## 2025-01-13 RX ORDER — SODIUM CHLORIDE 9 MG/ML
INJECTION, SOLUTION INTRAVENOUS CONTINUOUS
Status: DISCONTINUED | OUTPATIENT
Start: 2025-01-13 | End: 2025-01-15 | Stop reason: HOSPADM

## 2025-01-13 RX ORDER — 0.9 % SODIUM CHLORIDE 0.9 %
2 VIAL (ML) INJECTION EVERY 12 HOURS SCHEDULED
Status: CANCELLED | OUTPATIENT
Start: 2025-01-13

## 2025-01-13 RX ORDER — LIDOCAINE HYDROCHLORIDE 20 MG/ML
INJECTION, SOLUTION INFILTRATION; PERINEURAL PRN
Status: DISCONTINUED | OUTPATIENT
Start: 2025-01-13 | End: 2025-01-13 | Stop reason: HOSPADM

## 2025-01-13 RX ORDER — GABAPENTIN 300 MG/1
300 CAPSULE ORAL 3 TIMES DAILY
COMMUNITY

## 2025-01-13 RX ADMIN — FENTANYL CITRATE 50 MCG: 50 INJECTION INTRAMUSCULAR; INTRAVENOUS at 14:38

## 2025-01-13 RX ADMIN — FENTANYL CITRATE 50 MCG: 50 INJECTION INTRAMUSCULAR; INTRAVENOUS at 14:48

## 2025-01-13 RX ADMIN — LIDOCAINE HYDROCHLORIDE 5 ML: 20 INJECTION, SOLUTION INFILTRATION; PERINEURAL at 14:49

## 2025-01-13 RX ADMIN — SODIUM CHLORIDE: 9 INJECTION, SOLUTION INTRAVENOUS at 12:23

## 2025-01-13 RX ADMIN — MIDAZOLAM HYDROCHLORIDE 1 MG: 1 INJECTION, SOLUTION INTRAMUSCULAR; INTRAVENOUS at 14:41

## 2025-01-13 RX ADMIN — MIDAZOLAM HYDROCHLORIDE 1 MG: 1 INJECTION, SOLUTION INTRAMUSCULAR; INTRAVENOUS at 14:47

## 2025-01-13 ASSESSMENT — PAIN SCALES - GENERAL
PAINLEVEL_OUTOF10: 0
PAINLEVEL_OUTOF10: 2
PAINLEVEL_OUTOF10: 0
PAINLEVEL_OUTOF10: 0
PAINLEVEL_OUTOF10: 2
PAINLEVEL_OUTOF10: 0
PAINLEVEL_OUTOF10: 2

## 2025-01-14 ENCOUNTER — TELEPHONE (OUTPATIENT)
Dept: CARE COORDINATION | Age: 70
End: 2025-01-14

## 2025-01-15 ENCOUNTER — CASE MANAGEMENT (OUTPATIENT)
Dept: CARE COORDINATION | Age: 70
End: 2025-01-15

## 2025-01-16 LAB
ASR DISCLAIMER: NORMAL
CASE RPRT: NORMAL
CLINICAL INFO: NORMAL
PATH REPORT.FINAL DX SPEC: NORMAL
PATH REPORT.FINAL DX SPEC: NORMAL
PATH REPORT.GROSS SPEC: NORMAL
PATH REPORT.INTRAOP OBS SPEC DOC: NORMAL

## 2025-01-28 ENCOUNTER — CASE MANAGEMENT (OUTPATIENT)
Dept: CARE COORDINATION | Age: 70
End: 2025-01-28

## (undated) DEVICE — Device

## (undated) DEVICE — LAWSON - SUT VICP 2-0 VCP333H

## (undated) NOTE — LETTER
08/11/17        [address] [address]      Dear Charlie Philippe,    [facility] records indicate that you have outstanding lab work and or testing that was ordered for you and has not yet been completed: CBC, CMP, and hepatitis labs.      To pro

## (undated) NOTE — MR AVS SNAPSHOT
[facility]  [address] [place] [address]  [phone]               Thank you for choosing us for your health care visit with Jayne Baker MD.  We are glad to serve you and happy to provide you with this summary of your vis Assoc Dx:  Chronic hepatitis C without hepatic coma ([facility] [address] [B18.2]           Miscellaneous Testing [E]    Complete by:  Feb 15, 2017 (Approximate)    Assoc Dx:  Chronic hepatitis C without hepatic coma ([facility] [address] [B18.2]           US LIVER (CPT=76705)    Comple Tips for making healthy food choices  -   Enjoy your food, but eat less. Fully enjoy your food when eating. Don’t eat while distracted and slow down. Avoid over sized portions. Don’t eat while when you’re bored.      EAT THESE FOODS MORE OFTEN: EAT T

## (undated) NOTE — Clinical Note
4/21/2017    [address] [facility] [place] [address]            Dear Yosvany Caballero,      Our records indicate that you are due for an appointment for a Colonoscopy on or about May 2017 with Stew Donahue MD.    Please call our

## (undated) NOTE — Clinical Note
4/21/2017              [address] [facility]         Dear Amy Banegas,    Our records indicate that the tests ordered for you by Steven Ríos MD  have not been done.   If you have, in fact, already completed the t